# Patient Record
Sex: FEMALE | Race: WHITE | Employment: OTHER | URBAN - METROPOLITAN AREA
[De-identification: names, ages, dates, MRNs, and addresses within clinical notes are randomized per-mention and may not be internally consistent; named-entity substitution may affect disease eponyms.]

---

## 2017-06-20 ENCOUNTER — HOSPITAL ENCOUNTER (OUTPATIENT)
Dept: MAMMOGRAPHY | Age: 67
Discharge: HOME | End: 2017-06-20
Attending: OBSTETRICS & GYNECOLOGY | Admitting: OBSTETRICS & GYNECOLOGY

## 2017-06-20 DIAGNOSIS — Z13.9 SCREENING: ICD-10-CM

## 2018-07-03 ENCOUNTER — HOSPITAL ENCOUNTER (OUTPATIENT)
Dept: MAMMOGRAPHY | Age: 68
Discharge: HOME | End: 2018-07-03
Admitting: INTERNAL MEDICINE

## 2018-07-03 DIAGNOSIS — N95.1 MENOPAUSAL STATE: ICD-10-CM

## 2018-07-03 DIAGNOSIS — Z12.31 ENCOUNTER FOR SCREENING MAMMOGRAM FOR BREAST CANCER: ICD-10-CM

## 2018-07-03 DIAGNOSIS — N95.1 FEMALE CLIMACTERIC STATE: ICD-10-CM

## 2019-01-06 ENCOUNTER — HOSPITAL ENCOUNTER (EMERGENCY)
Age: 69
Discharge: HOME | End: 2019-01-06
Attending: EMERGENCY MEDICINE
Payer: MEDICARE

## 2019-01-06 ENCOUNTER — APPOINTMENT (OUTPATIENT)
Dept: CT IMAGING | Age: 69
End: 2019-01-06
Payer: MEDICARE

## 2019-01-06 VITALS
TEMPERATURE: 98.1 F | HEART RATE: 78 BPM | RESPIRATION RATE: 18 BRPM | SYSTOLIC BLOOD PRESSURE: 130 MMHG | HEIGHT: 63 IN | WEIGHT: 148 LBS | DIASTOLIC BLOOD PRESSURE: 70 MMHG | BODY MASS INDEX: 26.22 KG/M2 | OXYGEN SATURATION: 96 %

## 2019-01-06 DIAGNOSIS — R19.7 NAUSEA VOMITING AND DIARRHEA: Primary | ICD-10-CM

## 2019-01-06 DIAGNOSIS — R11.2 NAUSEA VOMITING AND DIARRHEA: Primary | ICD-10-CM

## 2019-01-06 LAB
A/G RATIO: 1.3 (ref 1.1–2.2)
ALBUMIN SERPL-MCNC: 3.9 G/DL (ref 3.4–5)
ALP BLD-CCNC: 53 U/L (ref 40–129)
ALT SERPL-CCNC: 31 U/L (ref 10–40)
ANION GAP SERPL CALCULATED.3IONS-SCNC: 12 MMOL/L (ref 3–16)
AST SERPL-CCNC: 37 U/L (ref 15–37)
BASOPHILS ABSOLUTE: 0 K/UL (ref 0–0.2)
BASOPHILS RELATIVE PERCENT: 0.4 %
BILIRUB SERPL-MCNC: 0.4 MG/DL (ref 0–1)
BILIRUBIN URINE: NEGATIVE
BLOOD, URINE: ABNORMAL
BUN BLDV-MCNC: 9 MG/DL (ref 7–20)
CALCIUM SERPL-MCNC: 8.8 MG/DL (ref 8.3–10.6)
CHLORIDE BLD-SCNC: 100 MMOL/L (ref 99–110)
CLARITY: CLEAR
CO2: 23 MMOL/L (ref 21–32)
COLOR: YELLOW
CREAT SERPL-MCNC: 0.6 MG/DL (ref 0.6–1.2)
EOSINOPHILS ABSOLUTE: 0 K/UL (ref 0–0.6)
EOSINOPHILS RELATIVE PERCENT: 0.8 %
GFR AFRICAN AMERICAN: >60
GFR NON-AFRICAN AMERICAN: >60
GLOBULIN: 2.9 G/DL
GLUCOSE BLD-MCNC: 102 MG/DL (ref 70–99)
GLUCOSE URINE: NEGATIVE MG/DL
HCT VFR BLD CALC: 39.8 % (ref 36–48)
HEMOGLOBIN: 13.4 G/DL (ref 12–16)
KETONES, URINE: NEGATIVE MG/DL
LEUKOCYTE ESTERASE, URINE: NEGATIVE
LIPASE: 28 U/L (ref 13–60)
LYMPHOCYTES ABSOLUTE: 0.7 K/UL (ref 1–5.1)
LYMPHOCYTES RELATIVE PERCENT: 20.8 %
MCH RBC QN AUTO: 30.3 PG (ref 26–34)
MCHC RBC AUTO-ENTMCNC: 33.6 G/DL (ref 31–36)
MCV RBC AUTO: 90.2 FL (ref 80–100)
MICROSCOPIC EXAMINATION: YES
MONOCYTES ABSOLUTE: 0.4 K/UL (ref 0–1.3)
MONOCYTES RELATIVE PERCENT: 11.4 %
NEUTROPHILS ABSOLUTE: 2.2 K/UL (ref 1.7–7.7)
NEUTROPHILS RELATIVE PERCENT: 66.6 %
NITRITE, URINE: NEGATIVE
PDW BLD-RTO: 13.3 % (ref 12.4–15.4)
PH UA: 7
PLATELET # BLD: 190 K/UL (ref 135–450)
PMV BLD AUTO: 6.6 FL (ref 5–10.5)
POTASSIUM SERPL-SCNC: 3.5 MMOL/L (ref 3.5–5.1)
PROTEIN UA: NEGATIVE MG/DL
RBC # BLD: 4.42 M/UL (ref 4–5.2)
RBC UA: NORMAL /HPF (ref 0–2)
SODIUM BLD-SCNC: 135 MMOL/L (ref 136–145)
SPECIFIC GRAVITY UA: <=1.005
TOTAL PROTEIN: 6.8 G/DL (ref 6.4–8.2)
URINE REFLEX TO CULTURE: ABNORMAL
URINE TYPE: ABNORMAL
UROBILINOGEN, URINE: 0.2 E.U./DL
WBC # BLD: 3.2 K/UL (ref 4–11)
WBC UA: NORMAL /HPF (ref 0–5)

## 2019-01-06 PROCEDURE — 6370000000 HC RX 637 (ALT 250 FOR IP): Performed by: EMERGENCY MEDICINE

## 2019-01-06 PROCEDURE — 6360000002 HC RX W HCPCS: Performed by: EMERGENCY MEDICINE

## 2019-01-06 PROCEDURE — 99284 EMERGENCY DEPT VISIT MOD MDM: CPT

## 2019-01-06 PROCEDURE — 74177 CT ABD & PELVIS W/CONTRAST: CPT

## 2019-01-06 PROCEDURE — 80053 COMPREHEN METABOLIC PANEL: CPT

## 2019-01-06 PROCEDURE — 83690 ASSAY OF LIPASE: CPT

## 2019-01-06 PROCEDURE — 96374 THER/PROPH/DIAG INJ IV PUSH: CPT

## 2019-01-06 PROCEDURE — 96361 HYDRATE IV INFUSION ADD-ON: CPT

## 2019-01-06 PROCEDURE — 6360000004 HC RX CONTRAST MEDICATION: Performed by: EMERGENCY MEDICINE

## 2019-01-06 PROCEDURE — 85025 COMPLETE CBC W/AUTO DIFF WBC: CPT

## 2019-01-06 PROCEDURE — 2580000003 HC RX 258: Performed by: EMERGENCY MEDICINE

## 2019-01-06 PROCEDURE — 81001 URINALYSIS AUTO W/SCOPE: CPT

## 2019-01-06 PROCEDURE — 36415 COLL VENOUS BLD VENIPUNCTURE: CPT

## 2019-01-06 PROCEDURE — 96375 TX/PRO/DX INJ NEW DRUG ADDON: CPT

## 2019-01-06 RX ORDER — 0.9 % SODIUM CHLORIDE 0.9 %
1000 INTRAVENOUS SOLUTION INTRAVENOUS ONCE
Status: COMPLETED | OUTPATIENT
Start: 2019-01-06 | End: 2019-01-06

## 2019-01-06 RX ORDER — PANTOPRAZOLE SODIUM 40 MG/1
40 TABLET, DELAYED RELEASE ORAL ONCE
Status: COMPLETED | OUTPATIENT
Start: 2019-01-06 | End: 2019-01-06

## 2019-01-06 RX ORDER — ACETAMINOPHEN 325 MG/1
650 TABLET ORAL EVERY 6 HOURS PRN
COMMUNITY

## 2019-01-06 RX ORDER — ATORVASTATIN CALCIUM 20 MG/1
20 TABLET, FILM COATED ORAL DAILY
COMMUNITY

## 2019-01-06 RX ORDER — ONDANSETRON 2 MG/ML
4 INJECTION INTRAMUSCULAR; INTRAVENOUS ONCE
Status: COMPLETED | OUTPATIENT
Start: 2019-01-06 | End: 2019-01-06

## 2019-01-06 RX ORDER — ONDANSETRON 4 MG/1
4 TABLET, FILM COATED ORAL EVERY 8 HOURS PRN
Qty: 20 TABLET | Refills: 0 | Status: SHIPPED | OUTPATIENT
Start: 2019-01-06 | End: 2019-10-15 | Stop reason: ALTCHOICE

## 2019-01-06 RX ADMIN — PANTOPRAZOLE SODIUM 40 MG: 40 TABLET, DELAYED RELEASE ORAL at 10:18

## 2019-01-06 RX ADMIN — SODIUM CHLORIDE 1000 ML: 9 INJECTION, SOLUTION INTRAVENOUS at 10:18

## 2019-01-06 RX ADMIN — IOPAMIDOL 75 ML: 755 INJECTION, SOLUTION INTRAVENOUS at 10:46

## 2019-01-06 RX ADMIN — ONDANSETRON 4 MG: 2 INJECTION INTRAMUSCULAR; INTRAVENOUS at 10:18

## 2019-01-06 RX ADMIN — LIDOCAINE HYDROCHLORIDE: 20 SOLUTION ORAL; TOPICAL at 11:33

## 2019-01-06 ASSESSMENT — ENCOUNTER SYMPTOMS
STRIDOR: 0
NAUSEA: 1
TROUBLE SWALLOWING: 0
FACIAL SWELLING: 0
DIARRHEA: 1
VOMITING: 1
WHEEZING: 0
BACK PAIN: 0
ABDOMINAL PAIN: 1
COLOR CHANGE: 0
VOICE CHANGE: 0
SHORTNESS OF BREATH: 0

## 2019-01-06 ASSESSMENT — PAIN DESCRIPTION - DESCRIPTORS: DESCRIPTORS: CONSTANT

## 2019-01-06 ASSESSMENT — PAIN DESCRIPTION - LOCATION: LOCATION: HEAD

## 2019-01-06 ASSESSMENT — PAIN SCALES - GENERAL: PAINLEVEL_OUTOF10: 1

## 2019-10-15 ENCOUNTER — HOSPITAL ENCOUNTER (INPATIENT)
Age: 69
LOS: 2 days | Discharge: HOME | DRG: 389 | End: 2019-10-17
Attending: EMERGENCY MEDICINE | Admitting: SURGERY
Payer: MEDICARE

## 2019-10-15 ENCOUNTER — APPOINTMENT (OUTPATIENT)
Dept: GENERAL RADIOLOGY | Age: 69
DRG: 389 | End: 2019-10-15
Payer: MEDICARE

## 2019-10-15 ENCOUNTER — APPOINTMENT (OUTPATIENT)
Dept: CT IMAGING | Age: 69
DRG: 389 | End: 2019-10-15
Payer: MEDICARE

## 2019-10-15 DIAGNOSIS — K56.609 SMALL BOWEL OBSTRUCTION (HCC): Primary | ICD-10-CM

## 2019-10-15 LAB
A/G RATIO: 1.6 (ref 1.1–2.2)
ALBUMIN SERPL-MCNC: 4.9 G/DL (ref 3.4–5)
ALP BLD-CCNC: 57 U/L (ref 40–129)
ALT SERPL-CCNC: 19 U/L (ref 10–40)
ANION GAP SERPL CALCULATED.3IONS-SCNC: 12 MMOL/L (ref 3–16)
AST SERPL-CCNC: 28 U/L (ref 15–37)
BASOPHILS ABSOLUTE: 0 K/UL (ref 0–0.2)
BASOPHILS RELATIVE PERCENT: 0.3 %
BILIRUB SERPL-MCNC: 0.4 MG/DL (ref 0–1)
BILIRUBIN URINE: NEGATIVE
BLOOD, URINE: NEGATIVE
BUN BLDV-MCNC: 19 MG/DL (ref 7–20)
CALCIUM SERPL-MCNC: 10.3 MG/DL (ref 8.3–10.6)
CHLORIDE BLD-SCNC: 92 MMOL/L (ref 99–110)
CLARITY: CLEAR
CO2: 24 MMOL/L (ref 21–32)
COLOR: YELLOW
CREAT SERPL-MCNC: 0.9 MG/DL (ref 0.6–1.2)
EKG ATRIAL RATE: 69 BPM
EKG DIAGNOSIS: NORMAL
EKG P AXIS: 48 DEGREES
EKG P-R INTERVAL: 200 MS
EKG Q-T INTERVAL: 420 MS
EKG QRS DURATION: 86 MS
EKG QTC CALCULATION (BAZETT): 450 MS
EKG R AXIS: -4 DEGREES
EKG T AXIS: 51 DEGREES
EKG VENTRICULAR RATE: 69 BPM
EOSINOPHILS ABSOLUTE: 0.1 K/UL (ref 0–0.6)
EOSINOPHILS RELATIVE PERCENT: 0.9 %
GFR AFRICAN AMERICAN: >60
GFR NON-AFRICAN AMERICAN: >60
GLOBULIN: 3 G/DL
GLUCOSE BLD-MCNC: 123 MG/DL (ref 70–99)
GLUCOSE URINE: NEGATIVE MG/DL
HCT VFR BLD CALC: 41.6 % (ref 36–48)
HEMOGLOBIN: 14.2 G/DL (ref 12–16)
KETONES, URINE: NEGATIVE MG/DL
LEUKOCYTE ESTERASE, URINE: NEGATIVE
LIPASE: 27 U/L (ref 13–60)
LYMPHOCYTES ABSOLUTE: 1.2 K/UL (ref 1–5.1)
LYMPHOCYTES RELATIVE PERCENT: 16.8 %
MCH RBC QN AUTO: 31.3 PG (ref 26–34)
MCHC RBC AUTO-ENTMCNC: 34.1 G/DL (ref 31–36)
MCV RBC AUTO: 91.7 FL (ref 80–100)
MICROSCOPIC EXAMINATION: NORMAL
MONOCYTES ABSOLUTE: 0.6 K/UL (ref 0–1.3)
MONOCYTES RELATIVE PERCENT: 8.2 %
NEUTROPHILS ABSOLUTE: 5.3 K/UL (ref 1.7–7.7)
NEUTROPHILS RELATIVE PERCENT: 73.8 %
NITRITE, URINE: NEGATIVE
PDW BLD-RTO: 13.7 % (ref 12.4–15.4)
PH UA: 7 (ref 5–8)
PLATELET # BLD: 261 K/UL (ref 135–450)
PMV BLD AUTO: 6.8 FL (ref 5–10.5)
POTASSIUM SERPL-SCNC: 3.9 MMOL/L (ref 3.5–5.1)
PROTEIN UA: NEGATIVE MG/DL
RBC # BLD: 4.54 M/UL (ref 4–5.2)
SODIUM BLD-SCNC: 128 MMOL/L (ref 136–145)
SPECIFIC GRAVITY UA: <=1.005 (ref 1–1.03)
TOTAL PROTEIN: 7.9 G/DL (ref 6.4–8.2)
TROPONIN: <0.01 NG/ML
URINE TYPE: NORMAL
UROBILINOGEN, URINE: 0.2 E.U./DL
WBC # BLD: 7.2 K/UL (ref 4–11)

## 2019-10-15 PROCEDURE — 84484 ASSAY OF TROPONIN QUANT: CPT

## 2019-10-15 PROCEDURE — 93005 ELECTROCARDIOGRAM TRACING: CPT | Performed by: EMERGENCY MEDICINE

## 2019-10-15 PROCEDURE — 2500000003 HC RX 250 WO HCPCS: Performed by: SURGERY

## 2019-10-15 PROCEDURE — 74018 RADEX ABDOMEN 1 VIEW: CPT

## 2019-10-15 PROCEDURE — 6360000004 HC RX CONTRAST MEDICATION: Performed by: EMERGENCY MEDICINE

## 2019-10-15 PROCEDURE — 85025 COMPLETE CBC W/AUTO DIFF WBC: CPT

## 2019-10-15 PROCEDURE — 74177 CT ABD & PELVIS W/CONTRAST: CPT

## 2019-10-15 PROCEDURE — 96375 TX/PRO/DX INJ NEW DRUG ADDON: CPT

## 2019-10-15 PROCEDURE — 6370000000 HC RX 637 (ALT 250 FOR IP): Performed by: EMERGENCY MEDICINE

## 2019-10-15 PROCEDURE — 2580000003 HC RX 258: Performed by: SURGERY

## 2019-10-15 PROCEDURE — 2580000003 HC RX 258: Performed by: EMERGENCY MEDICINE

## 2019-10-15 PROCEDURE — 99222 1ST HOSP IP/OBS MODERATE 55: CPT | Performed by: SURGERY

## 2019-10-15 PROCEDURE — 0D9670Z DRAINAGE OF STOMACH WITH DRAINAGE DEVICE, VIA NATURAL OR ARTIFICIAL OPENING: ICD-10-PCS | Performed by: SURGERY

## 2019-10-15 PROCEDURE — 6370000000 HC RX 637 (ALT 250 FOR IP): Performed by: SURGERY

## 2019-10-15 PROCEDURE — 99285 EMERGENCY DEPT VISIT HI MDM: CPT

## 2019-10-15 PROCEDURE — 96361 HYDRATE IV INFUSION ADD-ON: CPT

## 2019-10-15 PROCEDURE — 6360000002 HC RX W HCPCS: Performed by: SURGERY

## 2019-10-15 PROCEDURE — 83690 ASSAY OF LIPASE: CPT

## 2019-10-15 PROCEDURE — 6360000002 HC RX W HCPCS: Performed by: EMERGENCY MEDICINE

## 2019-10-15 PROCEDURE — 6370000000 HC RX 637 (ALT 250 FOR IP): Performed by: NURSE PRACTITIONER

## 2019-10-15 PROCEDURE — 1200000000 HC SEMI PRIVATE

## 2019-10-15 PROCEDURE — 93010 ELECTROCARDIOGRAM REPORT: CPT | Performed by: INTERNAL MEDICINE

## 2019-10-15 PROCEDURE — 80053 COMPREHEN METABOLIC PANEL: CPT

## 2019-10-15 PROCEDURE — 81003 URINALYSIS AUTO W/O SCOPE: CPT

## 2019-10-15 PROCEDURE — 96374 THER/PROPH/DIAG INJ IV PUSH: CPT

## 2019-10-15 RX ORDER — ATORVASTATIN CALCIUM 10 MG/1
20 TABLET, FILM COATED ORAL NIGHTLY
Status: DISCONTINUED | OUTPATIENT
Start: 2019-10-15 | End: 2019-10-17 | Stop reason: HOSPADM

## 2019-10-15 RX ORDER — SODIUM CHLORIDE 9 MG/ML
INJECTION, SOLUTION INTRAVENOUS CONTINUOUS
Status: DISCONTINUED | OUTPATIENT
Start: 2019-10-15 | End: 2019-10-17 | Stop reason: HOSPADM

## 2019-10-15 RX ORDER — LOSARTAN POTASSIUM 25 MG/1
50 TABLET ORAL DAILY
Status: DISCONTINUED | OUTPATIENT
Start: 2019-10-15 | End: 2019-10-17 | Stop reason: HOSPADM

## 2019-10-15 RX ORDER — ACETAMINOPHEN 650 MG/1
650 SUPPOSITORY RECTAL EVERY 6 HOURS PRN
Status: DISCONTINUED | OUTPATIENT
Start: 2019-10-15 | End: 2019-10-17 | Stop reason: HOSPADM

## 2019-10-15 RX ORDER — 0.9 % SODIUM CHLORIDE 0.9 %
1000 INTRAVENOUS SOLUTION INTRAVENOUS ONCE
Status: COMPLETED | OUTPATIENT
Start: 2019-10-15 | End: 2019-10-15

## 2019-10-15 RX ORDER — FLUOXETINE 10 MG/1
10 CAPSULE ORAL DAILY
Status: DISCONTINUED | OUTPATIENT
Start: 2019-10-15 | End: 2019-10-17 | Stop reason: HOSPADM

## 2019-10-15 RX ORDER — ONDANSETRON 2 MG/ML
4 INJECTION INTRAMUSCULAR; INTRAVENOUS EVERY 4 HOURS PRN
Status: DISCONTINUED | OUTPATIENT
Start: 2019-10-15 | End: 2019-10-17 | Stop reason: HOSPADM

## 2019-10-15 RX ORDER — ONDANSETRON 2 MG/ML
4 INJECTION INTRAMUSCULAR; INTRAVENOUS ONCE
Status: COMPLETED | OUTPATIENT
Start: 2019-10-15 | End: 2019-10-15

## 2019-10-15 RX ORDER — MORPHINE SULFATE 4 MG/ML
4 INJECTION, SOLUTION INTRAMUSCULAR; INTRAVENOUS ONCE
Status: COMPLETED | OUTPATIENT
Start: 2019-10-15 | End: 2019-10-15

## 2019-10-15 RX ORDER — PROMETHAZINE HYDROCHLORIDE 25 MG/ML
6.25 INJECTION, SOLUTION INTRAMUSCULAR; INTRAVENOUS EVERY 6 HOURS PRN
Status: DISCONTINUED | OUTPATIENT
Start: 2019-10-15 | End: 2019-10-17 | Stop reason: HOSPADM

## 2019-10-15 RX ORDER — MAGNESIUM HYDROXIDE/ALUMINUM HYDROXICE/SIMETHICONE 120; 1200; 1200 MG/30ML; MG/30ML; MG/30ML
15 SUSPENSION ORAL ONCE
Status: COMPLETED | OUTPATIENT
Start: 2019-10-15 | End: 2019-10-15

## 2019-10-15 RX ADMIN — ATORVASTATIN CALCIUM 20 MG: 10 TABLET, FILM COATED ORAL at 19:46

## 2019-10-15 RX ADMIN — ONDANSETRON HYDROCHLORIDE 4 MG: 2 INJECTION, SOLUTION INTRAMUSCULAR; INTRAVENOUS at 03:47

## 2019-10-15 RX ADMIN — ALUMINUM HYDROXIDE, MAGNESIUM HYDROXIDE, AND SIMETHICONE 15 ML: 200; 200; 20 SUSPENSION ORAL at 05:50

## 2019-10-15 RX ADMIN — Medication 1 SPRAY: at 20:44

## 2019-10-15 RX ADMIN — ENOXAPARIN SODIUM 40 MG: 40 INJECTION SUBCUTANEOUS at 08:24

## 2019-10-15 RX ADMIN — SODIUM CHLORIDE 1000 ML: 9 INJECTION, SOLUTION INTRAVENOUS at 03:47

## 2019-10-15 RX ADMIN — MORPHINE SULFATE 4 MG: 4 INJECTION INTRAVENOUS at 03:47

## 2019-10-15 RX ADMIN — HYDROMORPHONE HYDROCHLORIDE 0.5 MG: 1 INJECTION, SOLUTION INTRAMUSCULAR; INTRAVENOUS; SUBCUTANEOUS at 08:28

## 2019-10-15 RX ADMIN — FAMOTIDINE 20 MG: 10 INJECTION, SOLUTION INTRAVENOUS at 20:35

## 2019-10-15 RX ADMIN — PROMETHAZINE HYDROCHLORIDE 6.25 MG: 25 INJECTION INTRAMUSCULAR; INTRAVENOUS at 08:43

## 2019-10-15 RX ADMIN — LOSARTAN POTASSIUM 50 MG: 25 TABLET, FILM COATED ORAL at 19:46

## 2019-10-15 RX ADMIN — PROMETHAZINE HYDROCHLORIDE 6.25 MG: 25 INJECTION INTRAMUSCULAR; INTRAVENOUS at 16:09

## 2019-10-15 RX ADMIN — Medication 1 SPRAY: at 11:23

## 2019-10-15 RX ADMIN — SODIUM CHLORIDE: 9 INJECTION, SOLUTION INTRAVENOUS at 16:11

## 2019-10-15 RX ADMIN — HYDROMORPHONE HYDROCHLORIDE 0.5 MG: 1 INJECTION, SOLUTION INTRAMUSCULAR; INTRAVENOUS; SUBCUTANEOUS at 16:10

## 2019-10-15 RX ADMIN — SODIUM CHLORIDE: 9 INJECTION, SOLUTION INTRAVENOUS at 08:24

## 2019-10-15 RX ADMIN — IOPAMIDOL 75 ML: 755 INJECTION, SOLUTION INTRAVENOUS at 03:56

## 2019-10-15 RX ADMIN — HYDROMORPHONE HYDROCHLORIDE 0.5 MG: 1 INJECTION, SOLUTION INTRAMUSCULAR; INTRAVENOUS; SUBCUTANEOUS at 20:48

## 2019-10-15 RX ADMIN — FLUOXETINE 10 MG: 10 CAPSULE ORAL at 19:46

## 2019-10-15 RX ADMIN — FAMOTIDINE 20 MG: 10 INJECTION, SOLUTION INTRAVENOUS at 08:24

## 2019-10-15 RX ADMIN — ONDANSETRON HYDROCHLORIDE 4 MG: 2 INJECTION, SOLUTION INTRAMUSCULAR; INTRAVENOUS at 18:56

## 2019-10-15 ASSESSMENT — PAIN DESCRIPTION - PAIN TYPE
TYPE: ACUTE PAIN

## 2019-10-15 ASSESSMENT — PAIN - FUNCTIONAL ASSESSMENT: PAIN_FUNCTIONAL_ASSESSMENT: ACTIVITIES ARE NOT PREVENTED

## 2019-10-15 ASSESSMENT — PAIN DESCRIPTION - LOCATION
LOCATION: ABDOMEN
LOCATION: ABDOMEN
LOCATION: JAW
LOCATION: ABDOMEN

## 2019-10-15 ASSESSMENT — PAIN DESCRIPTION - DESCRIPTORS
DESCRIPTORS: SHARP
DESCRIPTORS: CRAMPING

## 2019-10-15 ASSESSMENT — PAIN DESCRIPTION - FREQUENCY
FREQUENCY: INTERMITTENT
FREQUENCY: CONTINUOUS

## 2019-10-15 ASSESSMENT — PAIN SCALES - GENERAL
PAINLEVEL_OUTOF10: 5
PAINLEVEL_OUTOF10: 3
PAINLEVEL_OUTOF10: 6
PAINLEVEL_OUTOF10: 5
PAINLEVEL_OUTOF10: 2
PAINLEVEL_OUTOF10: 4

## 2019-10-15 ASSESSMENT — PAIN DESCRIPTION - PROGRESSION: CLINICAL_PROGRESSION: GRADUALLY WORSENING

## 2019-10-15 ASSESSMENT — PAIN DESCRIPTION - ORIENTATION
ORIENTATION: UPPER
ORIENTATION: RIGHT;LEFT;MID

## 2019-10-15 ASSESSMENT — PAIN DESCRIPTION - ONSET: ONSET: ON-GOING

## 2019-10-16 ENCOUNTER — APPOINTMENT (OUTPATIENT)
Dept: GENERAL RADIOLOGY | Age: 69
DRG: 389 | End: 2019-10-16
Payer: MEDICARE

## 2019-10-16 LAB
ANION GAP SERPL CALCULATED.3IONS-SCNC: 11 MMOL/L (ref 3–16)
BASOPHILS ABSOLUTE: 0 K/UL (ref 0–0.2)
BASOPHILS RELATIVE PERCENT: 0.2 %
BUN BLDV-MCNC: 12 MG/DL (ref 7–20)
CALCIUM SERPL-MCNC: 8.1 MG/DL (ref 8.3–10.6)
CHLORIDE BLD-SCNC: 105 MMOL/L (ref 99–110)
CO2: 22 MMOL/L (ref 21–32)
CREAT SERPL-MCNC: 0.6 MG/DL (ref 0.6–1.2)
EOSINOPHILS ABSOLUTE: 0 K/UL (ref 0–0.6)
EOSINOPHILS RELATIVE PERCENT: 0.1 %
GFR AFRICAN AMERICAN: >60
GFR NON-AFRICAN AMERICAN: >60
GLUCOSE BLD-MCNC: 101 MG/DL (ref 70–99)
HCT VFR BLD CALC: 35 % (ref 36–48)
HEMOGLOBIN: 11.7 G/DL (ref 12–16)
LYMPHOCYTES ABSOLUTE: 1.1 K/UL (ref 1–5.1)
LYMPHOCYTES RELATIVE PERCENT: 14.7 %
MAGNESIUM: 2.1 MG/DL (ref 1.8–2.4)
MCH RBC QN AUTO: 31 PG (ref 26–34)
MCHC RBC AUTO-ENTMCNC: 33.5 G/DL (ref 31–36)
MCV RBC AUTO: 92.4 FL (ref 80–100)
MONOCYTES ABSOLUTE: 0.6 K/UL (ref 0–1.3)
MONOCYTES RELATIVE PERCENT: 8.2 %
NEUTROPHILS ABSOLUTE: 5.6 K/UL (ref 1.7–7.7)
NEUTROPHILS RELATIVE PERCENT: 76.8 %
PDW BLD-RTO: 13.9 % (ref 12.4–15.4)
PHOSPHORUS: 2.5 MG/DL (ref 2.5–4.9)
PLATELET # BLD: 207 K/UL (ref 135–450)
PMV BLD AUTO: 7 FL (ref 5–10.5)
POTASSIUM SERPL-SCNC: 3.9 MMOL/L (ref 3.5–5.1)
RBC # BLD: 3.79 M/UL (ref 4–5.2)
SODIUM BLD-SCNC: 138 MMOL/L (ref 136–145)
WBC # BLD: 7.2 K/UL (ref 4–11)

## 2019-10-16 PROCEDURE — 2500000003 HC RX 250 WO HCPCS: Performed by: SURGERY

## 2019-10-16 PROCEDURE — 1200000000 HC SEMI PRIVATE

## 2019-10-16 PROCEDURE — 6360000002 HC RX W HCPCS: Performed by: SURGERY

## 2019-10-16 PROCEDURE — 36415 COLL VENOUS BLD VENIPUNCTURE: CPT

## 2019-10-16 PROCEDURE — 83735 ASSAY OF MAGNESIUM: CPT

## 2019-10-16 PROCEDURE — 85025 COMPLETE CBC W/AUTO DIFF WBC: CPT

## 2019-10-16 PROCEDURE — 6370000000 HC RX 637 (ALT 250 FOR IP): Performed by: SURGERY

## 2019-10-16 PROCEDURE — 2580000003 HC RX 258: Performed by: SURGERY

## 2019-10-16 PROCEDURE — 99232 SBSQ HOSP IP/OBS MODERATE 35: CPT | Performed by: SURGERY

## 2019-10-16 PROCEDURE — 6360000002 HC RX W HCPCS: Performed by: NURSE PRACTITIONER

## 2019-10-16 PROCEDURE — 80048 BASIC METABOLIC PNL TOTAL CA: CPT

## 2019-10-16 PROCEDURE — 74019 RADEX ABDOMEN 2 VIEWS: CPT

## 2019-10-16 PROCEDURE — 84100 ASSAY OF PHOSPHORUS: CPT

## 2019-10-16 RX ORDER — KETOROLAC TROMETHAMINE 30 MG/ML
15 INJECTION, SOLUTION INTRAMUSCULAR; INTRAVENOUS EVERY 6 HOURS PRN
Status: DISCONTINUED | OUTPATIENT
Start: 2019-10-16 | End: 2019-10-17 | Stop reason: HOSPADM

## 2019-10-16 RX ADMIN — ATORVASTATIN CALCIUM 20 MG: 10 TABLET, FILM COATED ORAL at 20:42

## 2019-10-16 RX ADMIN — ENOXAPARIN SODIUM 40 MG: 40 INJECTION SUBCUTANEOUS at 05:49

## 2019-10-16 RX ADMIN — ONDANSETRON HYDROCHLORIDE 4 MG: 2 INJECTION, SOLUTION INTRAMUSCULAR; INTRAVENOUS at 10:02

## 2019-10-16 RX ADMIN — SODIUM CHLORIDE: 9 INJECTION, SOLUTION INTRAVENOUS at 01:00

## 2019-10-16 RX ADMIN — ACETAMINOPHEN 650 MG: 650 SUPPOSITORY RECTAL at 12:26

## 2019-10-16 RX ADMIN — KETOROLAC TROMETHAMINE 15 MG: 30 INJECTION, SOLUTION INTRAMUSCULAR at 15:42

## 2019-10-16 RX ADMIN — SODIUM CHLORIDE: 9 INJECTION, SOLUTION INTRAVENOUS at 15:42

## 2019-10-16 RX ADMIN — FAMOTIDINE 20 MG: 10 INJECTION, SOLUTION INTRAVENOUS at 08:59

## 2019-10-16 RX ADMIN — KETOROLAC TROMETHAMINE 15 MG: 30 INJECTION, SOLUTION INTRAMUSCULAR at 22:11

## 2019-10-16 RX ADMIN — Medication 1 SPRAY: at 00:59

## 2019-10-16 RX ADMIN — SODIUM CHLORIDE: 9 INJECTION, SOLUTION INTRAVENOUS at 08:59

## 2019-10-16 RX ADMIN — FAMOTIDINE 20 MG: 10 INJECTION, SOLUTION INTRAVENOUS at 20:43

## 2019-10-16 ASSESSMENT — PAIN SCALES - GENERAL
PAINLEVEL_OUTOF10: 4
PAINLEVEL_OUTOF10: 3
PAINLEVEL_OUTOF10: 4

## 2019-10-17 ENCOUNTER — APPOINTMENT (OUTPATIENT)
Dept: GENERAL RADIOLOGY | Age: 69
DRG: 389 | End: 2019-10-17
Payer: MEDICARE

## 2019-10-17 VITALS
DIASTOLIC BLOOD PRESSURE: 90 MMHG | BODY MASS INDEX: 26.28 KG/M2 | HEART RATE: 91 BPM | WEIGHT: 142.8 LBS | RESPIRATION RATE: 16 BRPM | HEIGHT: 62 IN | OXYGEN SATURATION: 100 % | SYSTOLIC BLOOD PRESSURE: 157 MMHG | TEMPERATURE: 97 F

## 2019-10-17 PROBLEM — K56.609 SMALL BOWEL OBSTRUCTION (HCC): Status: RESOLVED | Noted: 2019-10-15 | Resolved: 2019-10-17

## 2019-10-17 LAB
ANION GAP SERPL CALCULATED.3IONS-SCNC: 14 MMOL/L (ref 3–16)
BASOPHILS ABSOLUTE: 0 K/UL (ref 0–0.2)
BASOPHILS RELATIVE PERCENT: 0.2 %
BUN BLDV-MCNC: 10 MG/DL (ref 7–20)
CALCIUM SERPL-MCNC: 8 MG/DL (ref 8.3–10.6)
CHLORIDE BLD-SCNC: 102 MMOL/L (ref 99–110)
CO2: 20 MMOL/L (ref 21–32)
CREAT SERPL-MCNC: <0.5 MG/DL (ref 0.6–1.2)
EOSINOPHILS ABSOLUTE: 0.1 K/UL (ref 0–0.6)
EOSINOPHILS RELATIVE PERCENT: 0.8 %
GFR AFRICAN AMERICAN: >60
GFR NON-AFRICAN AMERICAN: >60
GLUCOSE BLD-MCNC: 66 MG/DL (ref 70–99)
HCT VFR BLD CALC: 34.6 % (ref 36–48)
HEMOGLOBIN: 11.5 G/DL (ref 12–16)
LYMPHOCYTES ABSOLUTE: 1.1 K/UL (ref 1–5.1)
LYMPHOCYTES RELATIVE PERCENT: 16.1 %
MAGNESIUM: 1.9 MG/DL (ref 1.8–2.4)
MCH RBC QN AUTO: 30.9 PG (ref 26–34)
MCHC RBC AUTO-ENTMCNC: 33.3 G/DL (ref 31–36)
MCV RBC AUTO: 92.7 FL (ref 80–100)
MONOCYTES ABSOLUTE: 0.5 K/UL (ref 0–1.3)
MONOCYTES RELATIVE PERCENT: 7.6 %
NEUTROPHILS ABSOLUTE: 5 K/UL (ref 1.7–7.7)
NEUTROPHILS RELATIVE PERCENT: 75.3 %
PDW BLD-RTO: 13.6 % (ref 12.4–15.4)
PHOSPHORUS: 1.6 MG/DL (ref 2.5–4.9)
PLATELET # BLD: 168 K/UL (ref 135–450)
PMV BLD AUTO: 7 FL (ref 5–10.5)
POTASSIUM SERPL-SCNC: 3.7 MMOL/L (ref 3.5–5.1)
RBC # BLD: 3.73 M/UL (ref 4–5.2)
SODIUM BLD-SCNC: 136 MMOL/L (ref 136–145)
WBC # BLD: 6.7 K/UL (ref 4–11)

## 2019-10-17 PROCEDURE — 85025 COMPLETE CBC W/AUTO DIFF WBC: CPT

## 2019-10-17 PROCEDURE — 6370000000 HC RX 637 (ALT 250 FOR IP): Performed by: SURGERY

## 2019-10-17 PROCEDURE — 83735 ASSAY OF MAGNESIUM: CPT

## 2019-10-17 PROCEDURE — 99238 HOSP IP/OBS DSCHRG MGMT 30/<: CPT | Performed by: SURGERY

## 2019-10-17 PROCEDURE — 84100 ASSAY OF PHOSPHORUS: CPT

## 2019-10-17 PROCEDURE — 36415 COLL VENOUS BLD VENIPUNCTURE: CPT

## 2019-10-17 PROCEDURE — 2500000003 HC RX 250 WO HCPCS: Performed by: SURGERY

## 2019-10-17 PROCEDURE — 80048 BASIC METABOLIC PNL TOTAL CA: CPT

## 2019-10-17 PROCEDURE — 2500000003 HC RX 250 WO HCPCS: Performed by: NURSE PRACTITIONER

## 2019-10-17 PROCEDURE — 74019 RADEX ABDOMEN 2 VIEWS: CPT

## 2019-10-17 PROCEDURE — 2580000003 HC RX 258: Performed by: NURSE PRACTITIONER

## 2019-10-17 RX ADMIN — POTASSIUM PHOSPHATE, MONOBASIC AND POTASSIUM PHOSPHATE, DIBASIC 20 MMOL: 224; 236 INJECTION, SOLUTION, CONCENTRATE INTRAVENOUS at 10:27

## 2019-10-17 RX ADMIN — FAMOTIDINE 20 MG: 10 INJECTION, SOLUTION INTRAVENOUS at 09:07

## 2019-10-17 RX ADMIN — FLUOXETINE 10 MG: 10 CAPSULE ORAL at 09:08

## 2019-10-17 RX ADMIN — LOSARTAN POTASSIUM 50 MG: 25 TABLET, FILM COATED ORAL at 09:07

## 2019-10-17 ASSESSMENT — PAIN SCALES - GENERAL: PAINLEVEL_OUTOF10: 0

## 2019-10-23 ENCOUNTER — HOSPITAL ENCOUNTER (OUTPATIENT)
Dept: MAMMOGRAPHY | Age: 69
Discharge: HOME | End: 2019-10-23
Payer: MEDICARE

## 2019-10-23 DIAGNOSIS — Z12.31 SCREENING MAMMOGRAM, ENCOUNTER FOR: ICD-10-CM

## 2019-10-23 PROCEDURE — 77063 BREAST TOMOSYNTHESIS BI: CPT

## 2020-10-26 ENCOUNTER — HOSPITAL ENCOUNTER (OUTPATIENT)
Dept: MAMMOGRAPHY | Age: 70
Discharge: HOME | End: 2020-10-26
Payer: MEDICARE

## 2020-10-26 ENCOUNTER — TELEPHONE (OUTPATIENT)
Dept: MAMMOGRAPHY | Age: 70
End: 2020-10-26

## 2020-10-26 PROCEDURE — 77063 BREAST TOMOSYNTHESIS BI: CPT

## 2021-05-13 ENCOUNTER — APPOINTMENT (OUTPATIENT)
Dept: CT IMAGING | Age: 71
DRG: 446 | End: 2021-05-13
Payer: MEDICARE

## 2021-05-13 ENCOUNTER — HOSPITAL ENCOUNTER (INPATIENT)
Age: 71
LOS: 3 days | Discharge: HOME | DRG: 446 | End: 2021-05-16
Attending: EMERGENCY MEDICINE | Admitting: INTERNAL MEDICINE
Payer: MEDICARE

## 2021-05-13 ENCOUNTER — APPOINTMENT (OUTPATIENT)
Dept: GENERAL RADIOLOGY | Age: 71
DRG: 446 | End: 2021-05-13
Payer: MEDICARE

## 2021-05-13 ENCOUNTER — APPOINTMENT (OUTPATIENT)
Dept: ULTRASOUND IMAGING | Age: 71
DRG: 446 | End: 2021-05-13
Payer: MEDICARE

## 2021-05-13 DIAGNOSIS — R10.10 PAIN OF UPPER ABDOMEN: Primary | ICD-10-CM

## 2021-05-13 DIAGNOSIS — R93.2 ABNORMAL GALLBLADDER ULTRASOUND: ICD-10-CM

## 2021-05-13 PROBLEM — R10.9 ABDOMINAL PAIN: Status: ACTIVE | Noted: 2021-05-13

## 2021-05-13 LAB
A/G RATIO: 1.7 (ref 1.1–2.2)
ALBUMIN SERPL-MCNC: 4.1 G/DL (ref 3.4–5)
ALP BLD-CCNC: 44 U/L (ref 40–129)
ALT SERPL-CCNC: 16 U/L (ref 10–40)
ANION GAP SERPL CALCULATED.3IONS-SCNC: 8 MMOL/L (ref 3–16)
AST SERPL-CCNC: 22 U/L (ref 15–37)
BASOPHILS ABSOLUTE: 0 K/UL (ref 0–0.2)
BASOPHILS RELATIVE PERCENT: 0.4 %
BILIRUB SERPL-MCNC: 0.3 MG/DL (ref 0–1)
BILIRUBIN URINE: NEGATIVE
BLOOD, URINE: NEGATIVE
BUN BLDV-MCNC: 12 MG/DL (ref 7–20)
CALCIUM SERPL-MCNC: 9.4 MG/DL (ref 8.3–10.6)
CHLORIDE BLD-SCNC: 100 MMOL/L (ref 99–110)
CLARITY: CLEAR
CO2: 27 MMOL/L (ref 21–32)
COLOR: YELLOW
CREAT SERPL-MCNC: 0.7 MG/DL (ref 0.6–1.2)
D DIMER: <200 NG/ML DDU (ref 0–229)
EKG ATRIAL RATE: 48 BPM
EKG DIAGNOSIS: NORMAL
EKG P AXIS: 20 DEGREES
EKG P-R INTERVAL: 180 MS
EKG Q-T INTERVAL: 470 MS
EKG QRS DURATION: 78 MS
EKG QTC CALCULATION (BAZETT): 419 MS
EKG R AXIS: -9 DEGREES
EKG T AXIS: 57 DEGREES
EKG VENTRICULAR RATE: 48 BPM
EOSINOPHILS ABSOLUTE: 0.1 K/UL (ref 0–0.6)
EOSINOPHILS RELATIVE PERCENT: 1.4 %
GFR AFRICAN AMERICAN: >60
GFR NON-AFRICAN AMERICAN: >60
GLOBULIN: 2.4 G/DL
GLUCOSE BLD-MCNC: 105 MG/DL (ref 70–99)
GLUCOSE URINE: NEGATIVE MG/DL
HCT VFR BLD CALC: 37.2 % (ref 36–48)
HEMOGLOBIN: 12.7 G/DL (ref 12–16)
INFLUENZA A: NOT DETECTED
INFLUENZA B: NOT DETECTED
KETONES, URINE: NEGATIVE MG/DL
LACTIC ACID: 0.7 MMOL/L (ref 0.4–2)
LEUKOCYTE ESTERASE, URINE: NEGATIVE
LIPASE: 23 U/L (ref 13–60)
LYMPHOCYTES ABSOLUTE: 0.9 K/UL (ref 1–5.1)
LYMPHOCYTES RELATIVE PERCENT: 17.2 %
MCH RBC QN AUTO: 31.4 PG (ref 26–34)
MCHC RBC AUTO-ENTMCNC: 34 G/DL (ref 31–36)
MCV RBC AUTO: 92.2 FL (ref 80–100)
MICROSCOPIC EXAMINATION: NORMAL
MONOCYTES ABSOLUTE: 0.4 K/UL (ref 0–1.3)
MONOCYTES RELATIVE PERCENT: 8.3 %
NEUTROPHILS ABSOLUTE: 3.7 K/UL (ref 1.7–7.7)
NEUTROPHILS RELATIVE PERCENT: 72.7 %
NITRITE, URINE: NEGATIVE
PDW BLD-RTO: 13.4 % (ref 12.4–15.4)
PH UA: 7.5 (ref 5–8)
PLATELET # BLD: 199 K/UL (ref 135–450)
PMV BLD AUTO: 7 FL (ref 5–10.5)
POTASSIUM REFLEX MAGNESIUM: 4 MMOL/L (ref 3.5–5.1)
PRO-BNP: 161 PG/ML (ref 0–124)
PROCALCITONIN: 0.02 NG/ML (ref 0–0.15)
PROTEIN UA: NEGATIVE MG/DL
RBC # BLD: 4.03 M/UL (ref 4–5.2)
SARS-COV-2 RNA, RT PCR: NOT DETECTED
SODIUM BLD-SCNC: 135 MMOL/L (ref 136–145)
SPECIFIC GRAVITY UA: 1.01 (ref 1–1.03)
TOTAL PROTEIN: 6.5 G/DL (ref 6.4–8.2)
TROPONIN: <0.01 NG/ML
URINE TYPE: NORMAL
UROBILINOGEN, URINE: 0.2 E.U./DL
WBC # BLD: 5.1 K/UL (ref 4–11)

## 2021-05-13 PROCEDURE — 85025 COMPLETE CBC W/AUTO DIFF WBC: CPT

## 2021-05-13 PROCEDURE — 83690 ASSAY OF LIPASE: CPT

## 2021-05-13 PROCEDURE — 74177 CT ABD & PELVIS W/CONTRAST: CPT

## 2021-05-13 PROCEDURE — 6360000002 HC RX W HCPCS: Performed by: INTERNAL MEDICINE

## 2021-05-13 PROCEDURE — C9113 INJ PANTOPRAZOLE SODIUM, VIA: HCPCS | Performed by: INTERNAL MEDICINE

## 2021-05-13 PROCEDURE — 87636 SARSCOV2 & INF A&B AMP PRB: CPT

## 2021-05-13 PROCEDURE — 6370000000 HC RX 637 (ALT 250 FOR IP): Performed by: INTERNAL MEDICINE

## 2021-05-13 PROCEDURE — 6370000000 HC RX 637 (ALT 250 FOR IP): Performed by: PHYSICIAN ASSISTANT

## 2021-05-13 PROCEDURE — 85379 FIBRIN DEGRADATION QUANT: CPT

## 2021-05-13 PROCEDURE — 81003 URINALYSIS AUTO W/O SCOPE: CPT

## 2021-05-13 PROCEDURE — 83880 ASSAY OF NATRIURETIC PEPTIDE: CPT

## 2021-05-13 PROCEDURE — 2580000003 HC RX 258: Performed by: INTERNAL MEDICINE

## 2021-05-13 PROCEDURE — 6360000004 HC RX CONTRAST MEDICATION: Performed by: PHYSICIAN ASSISTANT

## 2021-05-13 PROCEDURE — 80053 COMPREHEN METABOLIC PANEL: CPT

## 2021-05-13 PROCEDURE — 1200000000 HC SEMI PRIVATE

## 2021-05-13 PROCEDURE — 71045 X-RAY EXAM CHEST 1 VIEW: CPT

## 2021-05-13 PROCEDURE — 36415 COLL VENOUS BLD VENIPUNCTURE: CPT

## 2021-05-13 PROCEDURE — 96361 HYDRATE IV INFUSION ADD-ON: CPT

## 2021-05-13 PROCEDURE — 2580000003 HC RX 258: Performed by: PHYSICIAN ASSISTANT

## 2021-05-13 PROCEDURE — 96375 TX/PRO/DX INJ NEW DRUG ADDON: CPT

## 2021-05-13 PROCEDURE — 83605 ASSAY OF LACTIC ACID: CPT

## 2021-05-13 PROCEDURE — 84145 PROCALCITONIN (PCT): CPT

## 2021-05-13 PROCEDURE — 76705 ECHO EXAM OF ABDOMEN: CPT

## 2021-05-13 PROCEDURE — 6360000002 HC RX W HCPCS: Performed by: PHYSICIAN ASSISTANT

## 2021-05-13 PROCEDURE — 99285 EMERGENCY DEPT VISIT HI MDM: CPT

## 2021-05-13 PROCEDURE — 96376 TX/PRO/DX INJ SAME DRUG ADON: CPT

## 2021-05-13 PROCEDURE — 93005 ELECTROCARDIOGRAM TRACING: CPT | Performed by: PHYSICIAN ASSISTANT

## 2021-05-13 PROCEDURE — 84484 ASSAY OF TROPONIN QUANT: CPT

## 2021-05-13 PROCEDURE — 99223 1ST HOSP IP/OBS HIGH 75: CPT | Performed by: PHYSICIAN ASSISTANT

## 2021-05-13 PROCEDURE — 96374 THER/PROPH/DIAG INJ IV PUSH: CPT

## 2021-05-13 PROCEDURE — 93010 ELECTROCARDIOGRAM REPORT: CPT | Performed by: INTERNAL MEDICINE

## 2021-05-13 RX ORDER — PANTOPRAZOLE SODIUM 40 MG/10ML
40 INJECTION, POWDER, LYOPHILIZED, FOR SOLUTION INTRAVENOUS 2 TIMES DAILY
Status: DISCONTINUED | OUTPATIENT
Start: 2021-05-13 | End: 2021-05-16 | Stop reason: HOSPADM

## 2021-05-13 RX ORDER — ACETAMINOPHEN 650 MG/1
650 SUPPOSITORY RECTAL EVERY 6 HOURS PRN
Status: DISCONTINUED | OUTPATIENT
Start: 2021-05-13 | End: 2021-05-16 | Stop reason: HOSPADM

## 2021-05-13 RX ORDER — SODIUM CHLORIDE 9 MG/ML
INJECTION, SOLUTION INTRAVENOUS CONTINUOUS
Status: DISCONTINUED | OUTPATIENT
Start: 2021-05-13 | End: 2021-05-16 | Stop reason: HOSPADM

## 2021-05-13 RX ORDER — MAGNESIUM SULFATE 1 G/100ML
1000 INJECTION INTRAVENOUS PRN
Status: DISCONTINUED | OUTPATIENT
Start: 2021-05-13 | End: 2021-05-16 | Stop reason: HOSPADM

## 2021-05-13 RX ORDER — HYDRALAZINE HYDROCHLORIDE 20 MG/ML
5 INJECTION INTRAMUSCULAR; INTRAVENOUS EVERY 6 HOURS PRN
Status: DISCONTINUED | OUTPATIENT
Start: 2021-05-13 | End: 2021-05-16 | Stop reason: HOSPADM

## 2021-05-13 RX ORDER — PROPRANOLOL HYDROCHLORIDE 10 MG/1
10 TABLET ORAL 2 TIMES DAILY
COMMUNITY

## 2021-05-13 RX ORDER — 0.9 % SODIUM CHLORIDE 0.9 %
1000 INTRAVENOUS SOLUTION INTRAVENOUS ONCE
Status: COMPLETED | OUTPATIENT
Start: 2021-05-13 | End: 2021-05-13

## 2021-05-13 RX ORDER — MORPHINE SULFATE 2 MG/ML
2 INJECTION, SOLUTION INTRAMUSCULAR; INTRAVENOUS ONCE
Status: COMPLETED | OUTPATIENT
Start: 2021-05-13 | End: 2021-05-13

## 2021-05-13 RX ORDER — ONDANSETRON 2 MG/ML
4 INJECTION INTRAMUSCULAR; INTRAVENOUS EVERY 6 HOURS PRN
Status: DISCONTINUED | OUTPATIENT
Start: 2021-05-13 | End: 2021-05-16 | Stop reason: HOSPADM

## 2021-05-13 RX ORDER — FAMOTIDINE 20 MG/1
20 TABLET, FILM COATED ORAL ONCE
Status: COMPLETED | OUTPATIENT
Start: 2021-05-13 | End: 2021-05-13

## 2021-05-13 RX ORDER — POLYETHYLENE GLYCOL 3350 17 G/17G
17 POWDER, FOR SOLUTION ORAL DAILY PRN
Status: DISCONTINUED | OUTPATIENT
Start: 2021-05-13 | End: 2021-05-16 | Stop reason: HOSPADM

## 2021-05-13 RX ORDER — SODIUM CHLORIDE 9 MG/ML
25 INJECTION, SOLUTION INTRAVENOUS PRN
Status: DISCONTINUED | OUTPATIENT
Start: 2021-05-13 | End: 2021-05-16 | Stop reason: HOSPADM

## 2021-05-13 RX ORDER — POTASSIUM CHLORIDE 20 MEQ/1
40 TABLET, EXTENDED RELEASE ORAL PRN
Status: DISCONTINUED | OUTPATIENT
Start: 2021-05-13 | End: 2021-05-16 | Stop reason: HOSPADM

## 2021-05-13 RX ORDER — SODIUM CHLORIDE 0.9 % (FLUSH) 0.9 %
5-40 SYRINGE (ML) INJECTION EVERY 12 HOURS SCHEDULED
Status: DISCONTINUED | OUTPATIENT
Start: 2021-05-13 | End: 2021-05-16 | Stop reason: HOSPADM

## 2021-05-13 RX ORDER — SODIUM CHLORIDE 0.9 % (FLUSH) 0.9 %
5-40 SYRINGE (ML) INJECTION PRN
Status: DISCONTINUED | OUTPATIENT
Start: 2021-05-13 | End: 2021-05-16 | Stop reason: HOSPADM

## 2021-05-13 RX ORDER — LOSARTAN POTASSIUM 25 MG/1
50 TABLET ORAL DAILY
Status: DISCONTINUED | OUTPATIENT
Start: 2021-05-14 | End: 2021-05-16 | Stop reason: HOSPADM

## 2021-05-13 RX ORDER — PROPRANOLOL HYDROCHLORIDE 10 MG/1
10 TABLET ORAL 3 TIMES DAILY
Status: DISCONTINUED | OUTPATIENT
Start: 2021-05-13 | End: 2021-05-16 | Stop reason: HOSPADM

## 2021-05-13 RX ORDER — ACETAMINOPHEN 325 MG/1
650 TABLET ORAL EVERY 6 HOURS PRN
Status: DISCONTINUED | OUTPATIENT
Start: 2021-05-13 | End: 2021-05-16 | Stop reason: HOSPADM

## 2021-05-13 RX ORDER — ONDANSETRON 2 MG/ML
4 INJECTION INTRAMUSCULAR; INTRAVENOUS ONCE
Status: COMPLETED | OUTPATIENT
Start: 2021-05-13 | End: 2021-05-13

## 2021-05-13 RX ORDER — PROMETHAZINE HYDROCHLORIDE 25 MG/1
12.5 TABLET ORAL EVERY 6 HOURS PRN
Status: DISCONTINUED | OUTPATIENT
Start: 2021-05-13 | End: 2021-05-16 | Stop reason: HOSPADM

## 2021-05-13 RX ORDER — POTASSIUM CHLORIDE 7.45 MG/ML
10 INJECTION INTRAVENOUS PRN
Status: DISCONTINUED | OUTPATIENT
Start: 2021-05-13 | End: 2021-05-16 | Stop reason: HOSPADM

## 2021-05-13 RX ORDER — DOCUSATE SODIUM 100 MG/1
100 CAPSULE, LIQUID FILLED ORAL DAILY
Status: DISCONTINUED | OUTPATIENT
Start: 2021-05-14 | End: 2021-05-16 | Stop reason: HOSPADM

## 2021-05-13 RX ORDER — POLYETHYLENE GLYCOL 3350 17 G/17G
17 POWDER, FOR SOLUTION ORAL DAILY
Status: DISCONTINUED | OUTPATIENT
Start: 2021-05-14 | End: 2021-05-16 | Stop reason: HOSPADM

## 2021-05-13 RX ORDER — ONDANSETRON 4 MG/1
4 TABLET, ORALLY DISINTEGRATING ORAL ONCE
Status: COMPLETED | OUTPATIENT
Start: 2021-05-13 | End: 2021-05-13

## 2021-05-13 RX ADMIN — MORPHINE SULFATE 2 MG: 2 INJECTION, SOLUTION INTRAMUSCULAR; INTRAVENOUS at 12:04

## 2021-05-13 RX ADMIN — ONDANSETRON 4 MG: 4 TABLET, ORALLY DISINTEGRATING ORAL at 16:03

## 2021-05-13 RX ADMIN — MORPHINE SULFATE 2 MG: 2 INJECTION, SOLUTION INTRAMUSCULAR; INTRAVENOUS at 16:03

## 2021-05-13 RX ADMIN — Medication 10 ML: at 22:06

## 2021-05-13 RX ADMIN — HYDROMORPHONE HYDROCHLORIDE 0.25 MG: 1 INJECTION, SOLUTION INTRAMUSCULAR; INTRAVENOUS; SUBCUTANEOUS at 20:48

## 2021-05-13 RX ADMIN — LIDOCAINE HYDROCHLORIDE: 20 SOLUTION ORAL; TOPICAL at 16:59

## 2021-05-13 RX ADMIN — ONDANSETRON HYDROCHLORIDE 4 MG: 2 INJECTION, SOLUTION INTRAMUSCULAR; INTRAVENOUS at 10:32

## 2021-05-13 RX ADMIN — SODIUM CHLORIDE: 9 INJECTION, SOLUTION INTRAVENOUS at 22:06

## 2021-05-13 RX ADMIN — IOPAMIDOL 75 ML: 755 INJECTION, SOLUTION INTRAVENOUS at 11:32

## 2021-05-13 RX ADMIN — PROMETHAZINE HYDROCHLORIDE 12.5 MG: 25 TABLET ORAL at 20:42

## 2021-05-13 RX ADMIN — FAMOTIDINE 20 MG: 20 TABLET ORAL at 12:29

## 2021-05-13 RX ADMIN — PANTOPRAZOLE SODIUM 40 MG: 40 INJECTION, POWDER, FOR SOLUTION INTRAVENOUS at 22:13

## 2021-05-13 RX ADMIN — SODIUM CHLORIDE 1000 ML: 9 INJECTION, SOLUTION INTRAVENOUS at 11:56

## 2021-05-13 RX ADMIN — PROPRANOLOL HYDROCHLORIDE 10 MG: 10 TABLET ORAL at 22:06

## 2021-05-13 ASSESSMENT — PAIN DESCRIPTION - LOCATION
LOCATION: ABDOMEN
LOCATION: ABDOMEN

## 2021-05-13 ASSESSMENT — PAIN SCALES - GENERAL
PAINLEVEL_OUTOF10: 3
PAINLEVEL_OUTOF10: 6
PAINLEVEL_OUTOF10: 1

## 2021-05-13 ASSESSMENT — PAIN DESCRIPTION - PAIN TYPE
TYPE: ACUTE PAIN
TYPE: ACUTE PAIN

## 2021-05-13 NOTE — PLAN OF CARE
69-year-old female with intractable abdominal pain, associated with nausea and vomiting. Work-up in ER-negative dimer, negative troponin. CT abdomen-nonobstructing left nephrolithiasis  Right upper quadrant ultrasound-distended gallbladder, possible acalculous cholecystitis. Patient with persistent pain in the ED ,sensitive to morphine    Admit to [facility]  GI consult  IV Protonix  Check HIDA scan. Might need EGD.    prn low dose dilaudid

## 2021-05-13 NOTE — ED PROVIDER NOTES
[address] ED  EMERGENCY DEPARTMENT ENCOUNTER        Pt Name: Abhay Pagan  MRN: [id number]  [place] 1950  Date of evaluation: 5/13/2021  Provider: PRANAV Ordonez  PCP: GARY Pardo - CNP    This patient was seen and evaluated by the attending physician Sahra Villanueva, [facility]       Chief Complaint   Patient presents with    Abdominal Pain     epigastric pain started yesterday after caratact removal; vomited this morning; pain relieved after emesis       HISTORY OF PRESENT ILLNESS   (Location/Symptom, Timing/Onset, Context/Setting, Quality, Duration, Modifying Factors, Severity)  Note limiting factors. Abhay Pagan is a 69 y.o. female with past medical history of endometrial cancer 5 years ago, hypertension, hyperlipidemia history of blood clots who presents via private vehicle from her home for evaluation of epigastric abdominal pain. Patient notes that yesterday she had a cataract removed. She was given some medicine from the procedure she is not sure what. She notes that she went home felt fine when she woke up from her nap she had epigastric discomfort that she describes as a pressure. She woke up early this morning and she was having pain and pressure that was relieved with emesis. She is had one episode of emesis no hematemesis. She notes that since vomiting the pain has progressively returned, and again she describes it as a pressure. It radiates up into the lower part of the chest.  She denies any overt chest pain no difficulty breathing no shortness of breath. She notes she does have a history of small bowel obstruction however she did have a bowel movement this morning, she is not passed any gas since but denies any lower abdominal pain. She denies excessive belching. She denies any eye pain or drainage. She has tried Tums without significant improvement in her symptoms.        Nursing Notes were all reviewed and agreed with or any disagreements were addressed  in the HPI. Pt was seen during the [place] 19 pandemic. Appropriate PPE worn by ME during patient encounters. Pt seen during a time with constrained hospital bed capacity and other potential inpatient and outpatient resources were constrained due to the viral pandemic. REVIEW OF SYSTEMS    (2-9 systems for level 4, 10 or more for level 5)     Review of Systems    Positives and Pertinent negatives as per HPI. Except as noted abovein the ROS, all other systems were reviewed and negative. PAST MEDICAL HISTORY     Past Medical History:   Diagnosis Date    Cancer [facility])     endometrial, last chemo 5 yrs ago    Clostridium difficile infection 4/2/14    Hx of blood clots     Hyperlipidemia     Hypertension     Migraines          SURGICAL HISTORY     Past Surgical History:   Procedure Laterality Date    HYSTERECTOMY      OVARY REMOVAL N/A 2008    TUNNELED VENOUS PORT PLACEMENT Right removal    PORT A CATH REMOVAL RIGHT CHEST                CURRENTMEDICATIONS       Previous Medications    ACETAMINOPHEN (TYLENOL) 325 MG TABLET    Take 650 mg by mouth every 6 hours as needed for Pain    ATORVASTATIN (LIPITOR) 20 MG TABLET    Take 20 mg by mouth daily    FLUOXETINE (PROZAC) 20 MG CAPSULE    Take 10 mg by mouth daily     IBUPROFEN (ADVIL;MOTRIN) 200 MG TABLET    Take 200 mg by mouth every 6 hours as needed for Pain    LOSARTAN (COZAAR) 25 MG TABLET    Take 50 mg by mouth daily     PROPRANOLOL (INDERAL) 10 MG TABLET    Take 10 mg by mouth 3 times daily         ALLERGIES     Patient has no known allergies.     FAMILYHISTORY       Family History   Problem Relation Age of Onset   Buelah Ring[place] Cancer Mother         colon    Hypertension Mother     High Cholesterol Mother     High Cholesterol Father           SOCIAL HISTORY       Social History     Socioeconomic History    Marital status:      Spouse name: Not on file    Number of children: Not on file    Years of education: Not on file    Highest education level: Not on file   Occupational History    Not on file   Social Needs    Financial resource strain: Not on file    Food insecurity     Worry: Not on file     Inability: Not on file    Transportation needs     Medical: Not on file     Non-medical: Not on file   Tobacco Use    Smoking status: Never Smoker    Smokeless tobacco: Never Used   Substance and Sexual Activity    Alcohol use: Yes     Comment: occasionally    Drug use: No    Sexual activity: Not on file   Lifestyle    Physical activity     Days per week: Not on file     Minutes per session: Not on file    Stress: Not on file   Relationships    Social connections     Talks on phone: Not on file     Gets together: Not on file     Attends Sikhism service: Not on file     Active member of club or organization: Not on file     Attends meetings of clubs or organizations: Not on file     Relationship status: Not on file    Intimate partner violence     Fear of current or ex partner: Not on file     Emotionally abused: Not on file     Physically abused: Not on file     Forced sexual activity: Not on file   Other Topics Concern    Not on file   Social History Narrative    Not on file       SCREENINGS    Debora Coma Scale  Eye Opening: Spontaneous  Best Verbal Response: Oriented  Best Motor Response: Obeys commands  Rumson Coma Scale Score: 15        PHYSICAL EXAM    (up to 7 for level 4, 8 or more for level 5)     ED Triage Vitals [05/13/21 0949]   BP Temp Temp Source Pulse Resp SpO2 Height Weight   138/87 97.9 °F (36.6 °C) Oral 55 16 100 % 5' 3\" (1.6 m) 139 lb (63 kg)       Physical Exam  Vitals signs and nursing note reviewed. Constitutional:       General: She is awake. She is not in acute distress. Appearance: Normal appearance. She is well-developed. She is not ill-appearing, toxic-appearing or diaphoretic. HENT:      Head: Normocephalic and atraumatic.       Right Ear: External ear normal.      Left Ear: External ear normal.      Nose: Nose normal.      Mouth/Throat:      Mouth: Mucous membranes are moist.      Pharynx: Oropharynx is clear. Eyes:      General:         Right eye: No discharge. Left eye: No discharge. Extraocular Movements: Extraocular movements intact. Pupils: Pupils are equal, round, and reactive to light. Neck:      Musculoskeletal: Normal range of motion and neck supple. No neck rigidity. Cardiovascular:      Rate and Rhythm: Normal rate and regular rhythm. Pulses: Normal pulses. Heart sounds: Normal heart sounds. No murmur. No friction rub. No gallop. Pulmonary:      Effort: Pulmonary effort is normal. No respiratory distress. Breath sounds: Normal breath sounds. No wheezing or rales. Abdominal:      General: Bowel sounds are normal. There is no distension. Palpations: Abdomen is soft. Tenderness: There is abdominal tenderness in the right upper quadrant, epigastric area and left upper quadrant. There is no right CVA tenderness, left CVA tenderness, guarding or rebound. Hernia: No hernia is present. Musculoskeletal:      Right lower leg: No edema. Left lower leg: No edema. Lymphadenopathy:      Cervical: No cervical adenopathy. Skin:     General: Skin is warm and dry. Capillary Refill: Capillary refill takes less than 2 seconds. Coloration: Skin is not jaundiced or pale. Findings: No rash. Neurological:      General: No focal deficit present. Mental Status: She is alert, oriented to person, place, and time and easily aroused. Sensory: No sensory deficit. Motor: No weakness. Psychiatric:         Mood and Affect: Mood normal.         Behavior: Behavior normal. Behavior is cooperative.            DIAGNOSTIC RESULTS   LABS:    Labs Reviewed   CBC WITH AUTO DIFFERENTIAL - Abnormal; Notable for the following components:       Result Value    Lymphocytes Absolute 0.9 (*)     All other components within normal limits    Narrative:     Performed at:  [facility],  [place] [facility]   Phone [phone]   COMPREHENSIVE METABOLIC PANEL W/ REFLEX TO MG FOR LOW K - Abnormal; Notable for the following components:    Sodium 135 (*)     Glucose 105 (*)     All other components within normal limits    Narrative:     Performed at:  [facility],  [place] [facility]   Phone [phone]   BRAIN NATRIURETIC PEPTIDE - Abnormal; Notable for the following components:    Pro- (*)     All other components within normal limits    Narrative:     Performed at:  [facility] [address],  [place], [place] [facility]   Phone [phone]   TROPONIN    Narrative:     Performed at:  [facility] [address],  [place], [facility]   Phone [phone]   LIPASE    Narrative:     Performed at:  [facility] [address],  [place], [facility]   Phone [phone]   URINALYSIS    Narrative:     Performed at:  [facility],  [place], [place]   Phone [phone]   LACTIC ACID, PLASMA    Narrative:     Performed at:  [facility],  [place], [facility]   Phone [phone]       All other labs were within normal range or not returned as of this dictation. EKG: All EKG's are interpreted by the Emergency Department Physician who either signs orCo-signs this chart in the absence of a cardiologist.  Please see their note for interpretation of EKG.       RADIOLOGY:   Non-plain film images such as CT, Ultrasound and MRI are read by the radiologist. Plain radiographic images are visualized andpreliminarily interpreted by the  ED Provider with the below findings:        Interpretation perthe Radiologist below, if available at the time of this note:    CT ABDOMEN PELVIS W IV CONTRAST Additional Contrast? None   Final Result   Nonobstructing left nephrolithiasis. XR CHEST PORTABLE   Final Result   No acute cardiopulmonary disease. US GALLBLADDER RUQ    (Results Pending)     Ct Abdomen Pelvis W Iv Contrast Additional Contrast? None    Result Date: 5/13/2021  EXAMINATION: CT OF THE ABDOMEN AND PELVIS WITH CONTRAST 5/13/2021 11:25 am TECHNIQUE: CT of the abdomen and pelvis was performed with the administration of intravenous contrast. Multiplanar reformatted images are provided for review. Dose modulation, iterative reconstruction, and/or weight based adjustment of the mA/kV was utilized to reduce the radiation dose to as low as reasonably achievable. COMPARISON: 10/15/2019 HISTORY: ORDERING SYSTEM PROVIDED HISTORY: abd pain TECHNOLOGIST PROVIDED HISTORY: Reason for exam:->abd pain Additional Contrast?->None Decision Support Exception - unselect if not a suspected or confirmed emergency medical condition->Emergency Medical Condition (MA) Reason for Exam: abdominal pain , epigastric with NV Acuity: Acute Type of Exam: Initial FINDINGS: Lower Chest: There is no consolidation or effusion. Organs: The liver, pancreas, spleen, kidneys and adrenals are unremarkable aside from a nonobstructing 3 mm left intrarenal calculus. GI/Bowel: There is no bowel dilatation, wall thickening or obstruction. The appendix is normal. Pelvis: The uterus is surgically absent. The bladder is unremarkable. Peritoneum/Retroperitoneum: There is no free air, free fluid or intraperitoneal inflammatory change. There is no adenopathy. Bones/Soft Tissues: There is no acute fracture or aggressive osseous lesion. Nonobstructing left nephrolithiasis. Xr Chest Portable    Result Date: 5/13/2021  EXAMINATION: ONE XRAY VIEW OF THE CHEST 5/13/2021 10:32 am COMPARISON: None.  HISTORY: ORDERING SYSTEM PROVIDED HISTORY: epigastric abd pain TECHNOLOGIST PROVIDED HISTORY: Reason for exam:->epigastric abd pain Reason for Exam: Severe epigastric pain since last p.m. JRD Acuity: Acute Type of Exam: Initial FINDINGS: The cardiomediastinal silhouette is of normal size. The lungs are grossly clear. There is no pleural effusion. There is no pneumothorax. There is no acute osseous abnormality. No acute cardiopulmonary disease. PROCEDURES   Unless otherwise noted below, none     Procedures    CRITICAL CARE TIME   N/A    CONSULTS:  None      EMERGENCY DEPARTMENT COURSE and DIFFERENTIALDIAGNOSIS/MDM:   Vitals:    Vitals:    05/13/21 0949 05/13/21 1033 05/13/21 1225   BP: 138/87 (!) 168/84 (!) 176/86   Pulse: 55 55 60   Resp: 16 10 15   Temp: 97.9 °F (36.6 °C)     TempSrc: Oral     SpO2: 100% 100% 98%   Weight: 139 lb (63 kg)     Height: 5' 3\" (1.6 m)         Patient was given thefollowing medications:  Medications   ondansetron (ZOFRAN) injection 4 mg (4 mg Intravenous Given 5/13/21 1032)   0.9 % sodium chloride bolus (1,000 mLs Intravenous New Bag 5/13/21 1156)   morphine (PF) injection 2 mg (2 mg Intravenous Given 5/13/21 1204)   iopamidol (ISOVUE-370) 76 % injection 75 mL (75 mLs Intravenous Given 5/13/21 1132)   famotidine (PEPCID) tablet 20 mg (20 mg Oral Given 5/13/21 1229)       PDMP Monitoring:    Last PDMP Je as Reviewed [facility]):  Review User Review Instant Review Result            Urine Drug Screenings (1 yr)     No resulted procedures found. Medication Contract and Consent for Opioid Use Documents Filed      No documents found                MDM:   Patient seen and evaluated. Old records reviewed. Diagnostic testing reviewed and results discussed. Patient is a pleasant 67-year-old female who presents for evaluation of epigastric abdominal pain. On exam she is alert she is oriented afebrile well-perfused hemodynamically stable nontoxic. She has tenderness to palpation to the right upper quadrant epigastric region and left upper quadrant. Note rebound guarding or other peritoneal signs. She is nauseated however nausea and pain improved with IV morphine and Zofran. The remainder of her physical exam is without focal abnormality. Differentials at this time include gastritis,: Cystitis, GERD, less likely referred pain from ACS less likely pneumonia less likely PE.  CBC is without focal abnormality. CMP is reassuring no significant electrolyte derangements and hepatic and renal function is maintained. Her LFTs are normal.    Her troponin is negative. I have low concern for ACS. BNP is mildly elevated. I have low concern for CHF or significant right heart strain at this time. Her dimer is also negative I have low concern that this is referred pain from PE. Covid is negative. Urinalysis is negative. Chest x-ray is negative. CT is negative aside from left nonobstructing left nephrolithiasis. Right upper quadrant ultrasound reveals sonographic Vu sign with borderline distended gallbladder, consideration for a calculus cholecystitis. Common bile duct measuring within normal limits at 5 mm I have low concern for cholangitis or choledocholithiasis. These results were discussed with patient. Unfortunately I was unable to get the patient's pain adequately under control while in the department, she did not tolerate additional morphine and had severe nausea with it despite codosing it with antiemetic. I discussed these results with gastroenterology on-call, will plan for MRCP. I also discussed with general surgery on-call, Dr. Laine Martins who will follow. At this time I advocate the patient be admitted. All information including ED workup, results, treatment, diagnosis has been reviewed and discussed with ED attending physician and directly discussed with Hospitalist who is the admitting physician. Pt will be admitted in stable condition. Pt advised of admission and is in full agreement. FINAL IMPRESSION      1. Pain of upper abdomen    2. Abnormal gallbladder ultrasound          DISPOSITION/PLAN   DISPOSITION        PATIENT REFERREDTO:  No follow-up provider specified.     DISCHARGE MEDICATIONS:  New Prescriptions    No medications on file       DISCONTINUED MEDICATIONS:  Discontinued Medications    No medications on file              (Please note that portions ofthis note were completed with a voice recognition program.  Efforts were made to edit the dictations but occasionally words are mis-transcribed.)    Natanael Madrid (electronically signed)        Natanael Madrid  05/13/21 1954

## 2021-05-13 NOTE — H&P
Hospital Medicine History & Physical      PCP: Bev Colbert, APRN - CNP    Date of Admission: 5/13/2021    Date of Service: Pt seen/examined on 5/13/2021    Chief Complaint:    Chief Complaint   Patient presents with    Abdominal Pain     epigastric pain started yesterday after caratact removal; vomited this morning; pain relieved after emesis         History Of Present Illness: The patient is a 69 y.o. female with a PMH of Endometrial Cancer s/p hysterectomy, HLD, HTN who presented to [facility] ED with complaint of RUQ and epigastric abdominal pain with associated nausea and emesis x 1. Pt states she had cataract surgery yesterday and following the procedure she did not feel well. She woke up this morning and noted the pain. Reported having vegetable soup yesterday for dinner and no breakfast this morning apart from her coffee which she reports she threw up. She took a tums which did not help. Due to worsening pain, she decided to go to the ER. Denies any fevers, chills, nausea, diarrhea or constipation. Her last BM was this AM which was small, soft and brown w/o melena or hematochezia. Denies any radiation of pain symptoms. No hx of gallbladder disease or malignancy. Denies hx of PUD, NSAID use, family hx of gallstone disease, tobacco abuse or hx of working with chemicals used in the rubber and textile industry. She does report a hx of endometrial cancer s/p complete hysterectomy and chemotherapy. She is in remission at this time.     Past Medical History:        Diagnosis Date    Cancer [facility])     endometrial, last chemo 5 yrs ago    Clostridium difficile infection 4/2/14    Hx of blood clots     Hyperlipidemia     Hypertension     Migraines        Past Surgical History:        Procedure Laterality Date    HYSTERECTOMY      OVARY REMOVAL N/A 2008    TUNNELED VENOUS PORT PLACEMENT Right removal    PORT A CATH REMOVAL RIGHT CHEST              Medications Prior to Admission:    Prior to Admission conjunctival injection. Neck: Trachea midline. Resp: No accessory muscle use. No crackles. No wheezes. No rhonchi. On RA  CV: Regular rate. Regular rhythm. No murmur. No rub. No edema. Capillary Refill: Brisk,< 3 seconds   Peripheral Pulses: +2 palpable, equal bilaterally   GI: Soft, RUQ tenderness with deep palpation, + Vu's sign, Non-distended. Normal bowel sounds. Skin: Warm and dry. Decreased skin turgor. No rash on exposed extremities. Neuro: Awake. Grossly non-focal, moves all extremities, follows commands, no dysarthria   Psych: Oriented x 3. No anxiety or agitation. CBC:   Recent Labs     05/13/21  1006   WBC 5.1   HGB 12.7   HCT 37.2   MCV 92.2        BMP:   Recent Labs     05/13/21  1006   *   K 4.0      CO2 27   BUN 12   CREATININE 0.7     LIVER PROFILE:   Recent Labs     05/13/21  1006   AST 22   ALT 16   LIPASE 23.0   BILITOT 0.3   ALKPHOS 44     UA:  Recent Labs     05/13/21  1130   COLORU Yellow   PHUR 7.5   CLARITYU Clear   SPECGRAV 1.015   LEUKOCYTESUR Negative   UROBILINOGEN 0.2   BILIRUBINUR Negative   BLOODU Negative   GLUCOSEU Negative        CARDIAC ENZYMES  Recent Labs     05/13/21  1006   TROPONINI <0.01       U/A:    Lab Results   Component Value Date    COLORU Yellow 05/13/2021    WBCUA None seen 01/06/2019    RBCUA None seen 01/06/2019    BACTERIA Rare 11/28/2017    CLARITYU Clear 05/13/2021    SPECGRAV 1.015 05/13/2021    LEUKOCYTESUR Negative 05/13/2021    BLOODU Negative 05/13/2021    GLUCOSEU Negative 05/13/2021    GLUCOSEU NEGATIVE 01/12/2010     CULTURES    COVID 19 RAPID PCR: pending    Rapid Influenza A/B: pending      EKG:  I have reviewed the EKG with the following interpretation:   Marked sinus bradycardia rate of 48  Abnormal ECG  Confirmed by RADHA BARRIOS, [address] ([id number]) on 5/13/2021 2:01:40 PM     [address]   Final Result   1. Sonographic Vu side with borderline distended gallbladder.   No   obstructing gallstones

## 2021-05-13 NOTE — ED NOTES
Patient taken to ultrasound via wheelchair in stable condition via transport     Giovanny Leo RN  05/13/21 3298

## 2021-05-14 ENCOUNTER — APPOINTMENT (OUTPATIENT)
Dept: NUCLEAR MEDICINE | Age: 71
DRG: 446 | End: 2021-05-14
Payer: MEDICARE

## 2021-05-14 LAB
BASOPHILS ABSOLUTE: 0 K/UL (ref 0–0.2)
BASOPHILS RELATIVE PERCENT: 0.2 %
EOSINOPHILS ABSOLUTE: 0 K/UL (ref 0–0.6)
EOSINOPHILS RELATIVE PERCENT: 0.4 %
HCT VFR BLD CALC: 36.8 % (ref 36–48)
HEMOGLOBIN: 12.4 G/DL (ref 12–16)
LYMPHOCYTES ABSOLUTE: 1.3 K/UL (ref 1–5.1)
LYMPHOCYTES RELATIVE PERCENT: 18.1 %
MCH RBC QN AUTO: 31.6 PG (ref 26–34)
MCHC RBC AUTO-ENTMCNC: 33.7 G/DL (ref 31–36)
MCV RBC AUTO: 93.7 FL (ref 80–100)
MONOCYTES ABSOLUTE: 0.6 K/UL (ref 0–1.3)
MONOCYTES RELATIVE PERCENT: 8.8 %
NEUTROPHILS ABSOLUTE: 5.2 K/UL (ref 1.7–7.7)
NEUTROPHILS RELATIVE PERCENT: 72.5 %
PDW BLD-RTO: 13.7 % (ref 12.4–15.4)
PLATELET # BLD: 211 K/UL (ref 135–450)
PMV BLD AUTO: 7 FL (ref 5–10.5)
RBC # BLD: 3.93 M/UL (ref 4–5.2)
WBC # BLD: 7.2 K/UL (ref 4–11)

## 2021-05-14 PROCEDURE — 6370000000 HC RX 637 (ALT 250 FOR IP): Performed by: PHYSICIAN ASSISTANT

## 2021-05-14 PROCEDURE — 99232 SBSQ HOSP IP/OBS MODERATE 35: CPT | Performed by: INTERNAL MEDICINE

## 2021-05-14 PROCEDURE — 2580000003 HC RX 258: Performed by: INTERNAL MEDICINE

## 2021-05-14 PROCEDURE — A9537 TC99M MEBROFENIN: HCPCS | Performed by: INTERNAL MEDICINE

## 2021-05-14 PROCEDURE — 6360000002 HC RX W HCPCS: Performed by: INTERNAL MEDICINE

## 2021-05-14 PROCEDURE — 78226 HEPATOBILIARY SYSTEM IMAGING: CPT

## 2021-05-14 PROCEDURE — 1200000000 HC SEMI PRIVATE

## 2021-05-14 PROCEDURE — C9113 INJ PANTOPRAZOLE SODIUM, VIA: HCPCS | Performed by: INTERNAL MEDICINE

## 2021-05-14 PROCEDURE — 6360000002 HC RX W HCPCS: Performed by: PHYSICIAN ASSISTANT

## 2021-05-14 PROCEDURE — 85025 COMPLETE CBC W/AUTO DIFF WBC: CPT

## 2021-05-14 PROCEDURE — 6370000000 HC RX 637 (ALT 250 FOR IP): Performed by: INTERNAL MEDICINE

## 2021-05-14 PROCEDURE — 36415 COLL VENOUS BLD VENIPUNCTURE: CPT

## 2021-05-14 PROCEDURE — 3430000000 HC RX DIAGNOSTIC RADIOPHARMACEUTICAL: Performed by: INTERNAL MEDICINE

## 2021-05-14 RX ORDER — PREDNISOLONE ACETATE 10 MG/ML
2 SUSPENSION/ DROPS OPHTHALMIC 4 TIMES DAILY
Status: DISCONTINUED | OUTPATIENT
Start: 2021-05-14 | End: 2021-05-16 | Stop reason: HOSPADM

## 2021-05-14 RX ORDER — MOXIFLOXACIN 5 MG/ML
2 SOLUTION/ DROPS OPHTHALMIC 4 TIMES DAILY
Status: DISCONTINUED | OUTPATIENT
Start: 2021-05-14 | End: 2021-05-16 | Stop reason: HOSPADM

## 2021-05-14 RX ORDER — PREDNISOLONE ACETATE 10 MG/ML
2 SUSPENSION/ DROPS OPHTHALMIC 4 TIMES DAILY
COMMUNITY

## 2021-05-14 RX ORDER — MOXIFLOXACIN 5 MG/ML
2 SOLUTION/ DROPS OPHTHALMIC 4 TIMES DAILY
COMMUNITY

## 2021-05-14 RX ORDER — DICLOFENAC SODIUM 1 MG/ML
2 SOLUTION/ DROPS OPHTHALMIC 4 TIMES DAILY
Status: DISCONTINUED | OUTPATIENT
Start: 2021-05-14 | End: 2021-05-16 | Stop reason: HOSPADM

## 2021-05-14 RX ORDER — DICLOFENAC SODIUM 1 MG/ML
2 SOLUTION/ DROPS OPHTHALMIC 4 TIMES DAILY
COMMUNITY

## 2021-05-14 RX ADMIN — PROPRANOLOL HYDROCHLORIDE 10 MG: 10 TABLET ORAL at 20:43

## 2021-05-14 RX ADMIN — MOXIFLOXACIN 2 DROP: 5 SOLUTION/ DROPS OPHTHALMIC at 13:04

## 2021-05-14 RX ADMIN — PROPRANOLOL HYDROCHLORIDE 10 MG: 10 TABLET ORAL at 17:38

## 2021-05-14 RX ADMIN — Medication 10 ML: at 20:44

## 2021-05-14 RX ADMIN — DICLOFENAC SODIUM 2 DROP: 1 SOLUTION/ DROPS OPHTHALMIC at 20:43

## 2021-05-14 RX ADMIN — MOXIFLOXACIN 2 DROP: 5 SOLUTION/ DROPS OPHTHALMIC at 20:43

## 2021-05-14 RX ADMIN — PANTOPRAZOLE SODIUM 40 MG: 40 INJECTION, POWDER, FOR SOLUTION INTRAVENOUS at 07:55

## 2021-05-14 RX ADMIN — PREDNISOLONE ACETATE 2 DROP: 10 SUSPENSION/ DROPS OPHTHALMIC at 17:44

## 2021-05-14 RX ADMIN — PROPRANOLOL HYDROCHLORIDE 10 MG: 10 TABLET ORAL at 07:54

## 2021-05-14 RX ADMIN — ENOXAPARIN SODIUM 40 MG: 40 INJECTION SUBCUTANEOUS at 17:45

## 2021-05-14 RX ADMIN — LOSARTAN POTASSIUM 50 MG: 25 TABLET, FILM COATED ORAL at 07:55

## 2021-05-14 RX ADMIN — MOXIFLOXACIN 2 DROP: 5 SOLUTION/ DROPS OPHTHALMIC at 17:44

## 2021-05-14 RX ADMIN — HYDRALAZINE HYDROCHLORIDE 5 MG: 20 INJECTION INTRAMUSCULAR; INTRAVENOUS at 17:38

## 2021-05-14 RX ADMIN — PREDNISOLONE ACETATE 2 DROP: 10 SUSPENSION/ DROPS OPHTHALMIC at 13:05

## 2021-05-14 RX ADMIN — SODIUM CHLORIDE 1.33 MCG: 9 INJECTION, SOLUTION INTRAVENOUS at 13:48

## 2021-05-14 RX ADMIN — ACETAMINOPHEN 650 MG: 325 TABLET ORAL at 17:53

## 2021-05-14 RX ADMIN — PANTOPRAZOLE SODIUM 40 MG: 40 INJECTION, POWDER, FOR SOLUTION INTRAVENOUS at 20:43

## 2021-05-14 RX ADMIN — DICLOFENAC SODIUM 2 DROP: 1 SOLUTION/ DROPS OPHTHALMIC at 17:44

## 2021-05-14 RX ADMIN — PREDNISOLONE ACETATE 2 DROP: 10 SUSPENSION/ DROPS OPHTHALMIC at 20:43

## 2021-05-14 RX ADMIN — Medication 10 ML: at 07:55

## 2021-05-14 RX ADMIN — DICLOFENAC SODIUM 2 DROP: 1 SOLUTION/ DROPS OPHTHALMIC at 13:04

## 2021-05-14 RX ADMIN — Medication 5.4 MILLICURIE: at 15:07

## 2021-05-14 ASSESSMENT — PAIN SCALES - GENERAL: PAINLEVEL_OUTOF10: 8

## 2021-05-14 NOTE — CARE COORDINATION
Case Management Assessment  Initial Evaluation      Patient Name: Lester Moss  YOB: 1950  Diagnosis: Abdominal pain [R10.9]  Date / Time: 5/13/2021  9:41 AM    Admission status/Date: 5/13/21 inpt  Chart Reviewed: Yes      Patient Interviewed: Yes   Family Interviewed:  No      Hospitalization in the last 30 days:  No      Health Care Decision Maker :   Primary Decision Maker: Janel Janak - Spouse - [phone]    (CM - must 1st enter selection under Navigator - emergency contact- Health Care Decision Maker Relationship and pick relationship)   Who do you trust or have selected to make healthcare decisions for you      Met with:  Patient at bedside  Interview conducted  (bedside/phone):    Current PCP:  TOBIN LEONARD [address] [place] [address] required for SNF : Y          3 night stay required -  Arielle Thayer  Support Systems/Care Needs: Spouse/Significant Other  Transportation: self    Meal Preparation: self    Housing  Living Arrangements:  Lives 2 story home  Steps:  10 TE  Intent for return to present living arrangements: Yes  Identified Issues: [facility] with Home Health Care : No Agency:(Services)  Type of Home Care Services: None  Passport/Waiver : No  :                      Phone Number:    Passport/Waiver Services: N/A          Durable Medical Equiptment   DME Provider: N/A  Equipment: N/A  Walker___Cane___RTS___ BSC___Shower Chair___Hospital Bed___W/C____Other________  02 at ____Liter(s)---wears(frequency)_______ HHN ___ CPAP___ BiPap___   N/A____      Home O2 Use :  No    If No for home O2---if presently on O2 during hospitalization:  No  if yes CM to follow for potential DC O2 need  Informed of need for care provider to bring portable home O2 tank on day of discharge for nursing to connect prior to leaving:   Not Indicated  Verbalized agreement/Understanding:   Not Indicated    Community Service Affiliation  Dialysis:  No · Agency:  · Location:  · Dialysis Schedule:  · Phone:   · Fax: Other Community Services: (ex:PT/OT,Mental Health,Wound Clinic, Cardio/Pul [address]) None    DISCHARGE PLAN: Explained Case Management role/services. Reviewed chart and met with pt at bedside. Role of CM explained. Dustin lives home with spouse and is fully indep with all care. Anticipate no needs but will follow.

## 2021-05-14 NOTE — PROGRESS NOTES
Progress Note    Admit Date:  5/13/2021    Subjective:  Ms. Radha Eduardo feels much better today. abdominal pain is almost resolved. she still has some right upper quadrant tenderness on exam, denies any nausea or vomiting today. appetite is poor. currently n.p.o.   -> Due for a HIDA scan today. Objective:   BP (!) 154/89   Pulse 67   Temp 100.1 °F (37.8 °C) (Oral)   Resp 18   Ht 5' 3\" (1.6 m)   Wt 146 lb 3.2 oz (66.3 kg)   SpO2 96%   BMI 25.90 kg/m²   No intake or output data in the 24 hours ending 05/14/21 1051      Physical Exam:  General:  Awake, alert, NAD  Skin:  Warm and dry  Neck:  JVD absent. Neck supple  Chest:  Clear to auscultation, respiration easy. No wheezes, rales or rhonchi. Cardiovascular:  RRR ,S1S2 normal  Abdomen:  Soft, non distended, BS +  Right upper quadrant tender on exam  Extremities:  No edema. Intact peripheral pulses. Brisk cap refill, < 2 secs  Neuro: non focal      Medications:   Scheduled Meds:   prednisoLONE acetate  2 drop Left Eye 4x Daily    propranolol  10 mg Oral TID    losartan  50 mg Oral Daily    sodium chloride flush  5-40 mL Intravenous 2 times per day    enoxaparin  40 mg Subcutaneous Daily    pantoprazole  40 mg Intravenous BID    docusate sodium  100 mg Oral Daily    polyethylene glycol  17 g Oral Daily       Continuous Infusions:   sodium chloride 75 mL/hr at 05/13/21 2206    sodium chloride         Data:  CBC:   Recent Labs     05/13/21  1006 05/14/21  0520   WBC 5.1 7.2   RBC 4.03 3.93*   HGB 12.7 12.4   HCT 37.2 36.8   MCV 92.2 93.7   RDW 13.4 13.7    211     BMP:   Recent Labs     05/13/21  1006   *   K 4.0      CO2 27   BUN 12   CREATININE 0.7     BNP: No results for input(s): BNP in the last 72 hours. PT/INR: No results for input(s): PROTIME, INR in the last 72 hours. APTT: No results for input(s): APTT in the last 72 hours.   CARDIAC ENZYMES:   Recent Labs     05/13/21  1006   TROPONINI <0.01     FASTING LIPID PANEL:No results found for: CHOL, HDL, TRIG  LIVER PROFILE:   Recent Labs     05/13/21  1006   AST 22   ALT 16   BILITOT 0.3   ALKPHOS 44         CULTURES     COVID 19 RAPID PCR: pending     Rapid Influenza A/B: pending       EKG:  I have reviewed the EKG with the following interpretation:   Marked sinus bradycardia rate of 48  Abnormal ECG  Confirmed by RADHA BARRIOS, MADHURI ([id number]) on 5/13/2021 2:01:40 PM      RADIOLOGY    US GALLBLADDER RUQ   Final Result   1. Sonographic Vu side with borderline distended gallbladder. No   obstructing gallstones identified. Findings could be seen in acalculous   cholecystitis. Consider a HIDA scan for further confirmation. 2. The patient has gallbladder polyps, measuring less than 6 mm. If the   patient has risk factors for gallbladder carcinoma, follow-up ultrasound in 6   months is recommended. CT ABDOMEN PELVIS W IV CONTRAST Additional Contrast? None   Final Result   Nonobstructing left nephrolithiasis. XR CHEST PORTABLE   Final Result   No acute cardiopulmonary disease. NM HEPATOBILIARY SCAN W PHARMACOLOGICAL INTERVENTION    (Results Pending)         Assessment:  Active Problems:    Abdominal pain  Resolved Problems:    * No resolved hospital problems.  *      Plan:    Nausea and Vomiting  Intractable Abdominal Pain  - no leukocytosis, LFTs normal, pt is afebrile  - CT abdomen: non-acute; RUQ U/S: sonographic Vu sign with borderline distended gallbladder, no obstructing stone identified  - Admit to Med Surg  - pct normal   - check HIDA scan today  - NPO, IVF, PRN Dilaudid, PRN anti-emetics  - GI consult     Gallbladder Polyps  - noted on RUQ U/S  - assessed RF for GB carcinoma - female, older age and findings of gallbladder polyps; denies tobacco abuse, hx of gallstones or bile duct disease, family hx of GB cancer, or exposure to chemicals in textile and rubber industry  - recommended f/u U/S OP in 6 months      Sinus Bradycardia  - HR noted to be in low mid 50s, EKG with HR in upper 40s  - currently on BB  - HR has since improved, will add holding parameters and monitor on tele     Recent Cataract Surgery  - on 5/12  - elevate head of bed  - will continue eye drops once reconciled by RN     HTN  - uncontrolled - likely worsened with pain, also pt had not taken her meds this morning  - cont Cozaar, Propranolol  - will monitor with pain control as above     HLD  - hold for now     DVT Prophylaxis: Lovenox  Diet: Diet NPO Effective Now  Code Status: full      F/U HIDA scan results  If normal -> consider advancing diet.   If abnormal she will need surgery eval for possible cholecystectomy    Marleny Silveira MD 5/14/2021 10:51 AM

## 2021-05-14 NOTE — PROGRESS NOTES
4 Eyes Skin Assessment     The patient is being assess for   Admission    I agree that 2 RN's have performed a thorough Head to Toe Skin Assessment on the patient. ALL assessment sites listed below have been assessed. Areas assessed by both nurses:   [x]   Head, Face, and Ears   [x]   Shoulders, Back, and Chest, Abdomen  [x]   Arms, Elbows, and Hands   [x]   Coccyx, Sacrum, and Ischium  [x]   Legs, Feet, and Heels        No issues noted upon assessment    Co-signer eSignature: Electronically signed by Ny Woodson RN on 5/14/21 at 6:00 AM EDT    Does the Patient have Skin Breakdown?   No          Reed Prevention initiated:  No   Wound Care Orders initiated:  No      WOC nurse consulted for Pressure Injury (Stage 3,4, Unstageable, DTI, NWPT, Complex wounds)and New or Established Ostomies:  No      Primary Nurse eSignature: Electronically signed by Sunday Art RN on 5/14/21 at 5:59 AM EDT

## 2021-05-14 NOTE — FLOWSHEET NOTE
05/14/21 0746   Vital Signs   Temp 100.1 °F (37.8 °C)   Temp Source Oral   Pulse 67   Heart Rate Source Monitor   Resp 18   BP (!) 154/89   BP Location Left Arm   Patient Position Semi fowlers   Level of Consciousness Alert (0)   MEWS Score 1   Oxygen Therapy   SpO2 96 %   O2 Device None (Room air)     Vitals as above this am. Assessment completed, see flowsheets. Patient in stable condition this am, blood pressure medication and protonix given. Patient educated on plan of care for the day including HIDA scan that will occur this afternoon. Patient verbalized understanding. Patient provided with non skid socks and was assisted to and from the restroom. Call light within reach.

## 2021-05-14 NOTE — ACP (ADVANCE CARE PLANNING)
Advance Care Planning   Healthcare Decision Maker:    Primary Decision Maker: Nu Mcgowan Spouse - [phone]    Click here to complete Healthcare Decision Makers including selection of the Healthcare Decision Maker Relationship (ie \"Primary\").

## 2021-05-14 NOTE — PROGRESS NOTES
Pt medicated with Phenergan and dilaudid per admitting orders.   Report given to [facility] on [facility].

## 2021-05-14 NOTE — CONSULTS
Gastroenterology Consult Note    Patient:   Batool Armenta   :    1950   Facility:   [facility]  Referring/PCP: Kayli Peerz APRN - CNP  Date:     2021  Consultant:   Annita Rodriguez PA-C      Chief Complaint   Patient presents with    Abdominal Pain     epigastric pain started yesterday after caratact removal; vomited this morning; pain relieved after emesis        History of Present illness   69year old female with a history of endometrial cancer s/p complete hysterectomy and chemotherapy in remission, HLD, HTN, and migraine headaches presented to the ED with abdominal pain. She felt \"off\" after eye surgery two days ago. She woke up yesterday morning with upper abdominal pain worse in the RUQ. She vomited up the soup she had for dinner the night before while in the waiting room at the ED. She admits to lingering epigastric abdominal pain and nausea. She denies dysphagia, heartburn, cramping, bloating, change in bowel habits, rectal bleeding, melena, weight loss, or early satiety. She drinks four glasses of wine weekly. She denies Tylenol, ibuprofen, tobacco, marijuana, and illicit substance use, or recent medication changes. She has a personal history of colon polyps, and family history of colon cancer in her mother. Her last colonoscopy in  showed benign precancerous polyp. She has never had an EGD.     Past Medical History:   Diagnosis Date    Cancer [facility])     endometrial, last chemo 5 yrs ago    Clostridium difficile infection 14    Hx of blood clots     Hyperlipidemia     Hypertension     Migraines      Past Surgical History:   Procedure Laterality Date    HYSTERECTOMY      OVARY REMOVAL N/A     TUNNELED VENOUS PORT PLACEMENT Right removal    PORT A CATH REMOVAL RIGHT CHEST              Social:   Social History     Tobacco Use    Smoking status: Never Smoker    Smokeless tobacco: Never Used   Substance Use Topics    Alcohol use: Yes     Comment: occasionally     Family:   Family History   Problem Relation Age of Onset    Cancer Mother         colon    Hypertension Mother     High Cholesterol Mother     High Cholesterol Father      No current facility-administered medications on file prior to encounter.       Current Outpatient Medications on File Prior to Encounter   Medication Sig Dispense Refill    diclofenac (VOLTAREN) 0.1 % ophthalmic solution Place 2 drops into the left eye 4 times daily      prednisoLONE acetate (PRED FORTE) 1 % ophthalmic suspension Place 2 drops into the left eye 4 times daily      moxifloxacin (VIGAMOX) 0.5 % ophthalmic solution Place 2 drops into the left eye 4 times daily       propranolol (INDERAL) 10 MG tablet Take 10 mg by mouth 3 times daily      atorvastatin (LIPITOR) 20 MG tablet Take 20 mg by mouth daily      FLUoxetine (PROZAC) 20 MG capsule Take 10 mg by mouth daily       losartan (COZAAR) 25 MG tablet Take 50 mg by mouth daily       acetaminophen (TYLENOL) 325 MG tablet Take 650 mg by mouth every 6 hours as needed for Pain      ibuprofen (ADVIL;MOTRIN) 200 MG tablet Take 200 mg by mouth every 6 hours as needed for Pain        Infusions:    sodium chloride 75 mL/hr at 05/13/21 2206    sodium chloride       PRN Medications: sodium chloride flush, sodium chloride, potassium chloride **OR** potassium alternative oral replacement **OR** potassium chloride, magnesium sulfate, promethazine **OR** ondansetron, polyethylene glycol, acetaminophen **OR** acetaminophen, HYDROmorphone, hydrALAZINE  Allergies: No Known Allergies    ROS:   Constitutional: negative for chills, fevers and sweats  Eyes: negative for cataracts, icterus and redness  Ears, nose, mouth, throat, and face: negative for epistaxis, hearing loss and sore throat  Respiratory: negative for cough, hemoptysis and sputum  Cardiovascular: negative for chest pain, dyspnea and lower extremity edema  Gastrointestinal: as per HPI  Genitourinary:negative for dysuria, frequency and hematuria  Neurological: negative for coordination problems, dizziness and gait problems  Behavioral/Psych: negative for anxiety, depression and mood swings    Physical Exam   BP (!) 172/98   Pulse 84   Temp 99.2 °F (37.3 °C) (Oral)   Resp 16   Ht 5' 3\" (1.6 m)   Wt 146 lb 3.2 oz (66.3 kg)   SpO2 98%   BMI 25.90 kg/m²       General appearance: alert, appears stated age, cooperative and no distress  Head: Normocephalic, without obvious abnormality, atraumatic  Eyes: conjunctivae/corneas clear. PERRL, EOM's intact. Fundi benign. Neck: no adenopathy and supple, symmetrical, trachea midline  Lungs: clear to auscultation bilaterally  Heart: regular rate and rhythm, S1, S2 normal, no murmur, click, rub or gallop  Abdomen: normal findings: bowel sounds normal, no masses palpable and symmetric and abnormal findings:  tenderness mild in the epigastrium  Extremities: extremities normal, atraumatic, no cyanosis or edema    Lab and Imaging Review   Labs:  CBC:   Recent Labs     05/13/21  1006 05/14/21  0520   WBC 5.1 7.2   HGB 12.7 12.4   HCT 37.2 36.8   MCV 92.2 93.7    211     BMP:   Recent Labs     05/13/21  1006   *   K 4.0      CO2 27   BUN 12   CREATININE 0.7     LIVER PROFILE:   Recent Labs     05/13/21  1006   AST 22   ALT 16   LIPASE 23.0   PROT 6.5   BILITOT 0.3   ALKPHOS 44         IMAGING:  XR CHEST PORTABLE  Impression   No acute cardiopulmonary disease. CT ABDOMEN PELVIS W IV CONTRAST   Impression   Nonobstructing left nephrolithiasis. US GALLBLADDER RUQ  Impression   1. Sonographic Vu side with borderline distended gallbladder.  No   obstructing gallstones identified.  Findings could be seen in acalculous   cholecystitis.  Consider a HIDA scan for further confirmation. 2. The patient has gallbladder polyps, measuring less than 6 mm.  If the   patient has risk factors for gallbladder carcinoma, follow-up ultrasound in 6   months is recommended.      NM HEPATOBILIARY SCAN W PHARMACOLOGICAL INTERVENTION  Status: In process      Attending Supervising [de-identified] Attestation Statement  The patient is a 69 y.o. female. I have performed a history and physical examination of the patient. I discussed the case with my physician assistant Krishna Turpin PA-C    I reviewed the patient's Past Medical History, Past Surgical History, Medications, and Allergies. Physical Exam:  Vitals:    05/14/21 0506 05/14/21 0746 05/14/21 1359 05/14/21 1736   BP: (!) 165/95 (!) 154/89 (!) 172/98 (!) 186/92   Pulse: 70 67 84 69   Resp: 14 18 16 18   Temp: 98.1 °F (36.7 °C) 100.1 °F (37.8 °C) 99.2 °F (37.3 °C) 99.2 °F (37.3 °C)   TempSrc: Oral Oral Oral Oral   SpO2: 96% 96% 98% 95%   Weight:       Height:             Assessment:   69year old female with a history of endometrial cancer s/p complete hysterectomy and chemotherapy in remission, HLD, HTN, and migraine headaches admitted with nausea, vomiting, and abdominal pain concerning for acute cholecystitis but cannot exclude PUD vs reactive gastritis. Plan:   1. Continue supportive care  2. Monitor and document output  3. Monitor LFTs  4. NPO, IVF, pain control  5. Await HIDA scan results  6. Ok to start low fat diet from GI standpoint if HIDA scan normal  7. Will follow  8. Will consider outpatient EGD if HIDA scan negative    [place] PA-C  3:13 PM 5/14/2021                      69year old female with a history of HTN, HLD, headaches, endometrial cancer s/p complete hysterectomy and chemotherapy in remission admitted with nausea, vomiting, and abdominal pain, likely acute cholecystitis but cannot exclude PUD vs reactive gastritis. Continue supportive care. PPI BID. Await HIDA scan.  Can consider EGD based on HIDA scan results    Rebekah Alvarado MD          (O) [phone]  Mikala Palomo

## 2021-05-15 PROCEDURE — 6370000000 HC RX 637 (ALT 250 FOR IP): Performed by: INTERNAL MEDICINE

## 2021-05-15 PROCEDURE — 1200000000 HC SEMI PRIVATE

## 2021-05-15 PROCEDURE — C9113 INJ PANTOPRAZOLE SODIUM, VIA: HCPCS | Performed by: INTERNAL MEDICINE

## 2021-05-15 PROCEDURE — 6360000002 HC RX W HCPCS: Performed by: INTERNAL MEDICINE

## 2021-05-15 PROCEDURE — 2580000003 HC RX 258: Performed by: INTERNAL MEDICINE

## 2021-05-15 PROCEDURE — 99232 SBSQ HOSP IP/OBS MODERATE 35: CPT | Performed by: INTERNAL MEDICINE

## 2021-05-15 PROCEDURE — 6370000000 HC RX 637 (ALT 250 FOR IP): Performed by: PHYSICIAN ASSISTANT

## 2021-05-15 RX ADMIN — PROPRANOLOL HYDROCHLORIDE 10 MG: 10 TABLET ORAL at 14:29

## 2021-05-15 RX ADMIN — ACETAMINOPHEN 650 MG: 325 TABLET ORAL at 00:07

## 2021-05-15 RX ADMIN — AMPICILLIN AND SULBACTAM 1500 MG: 1; .5 INJECTION, POWDER, FOR SOLUTION INTRAMUSCULAR; INTRAVENOUS at 14:29

## 2021-05-15 RX ADMIN — ENOXAPARIN SODIUM 40 MG: 40 INJECTION SUBCUTANEOUS at 09:36

## 2021-05-15 RX ADMIN — PROPRANOLOL HYDROCHLORIDE 10 MG: 10 TABLET ORAL at 09:37

## 2021-05-15 RX ADMIN — SODIUM CHLORIDE: 9 INJECTION, SOLUTION INTRAVENOUS at 20:31

## 2021-05-15 RX ADMIN — MOXIFLOXACIN 2 DROP: 5 SOLUTION/ DROPS OPHTHALMIC at 08:08

## 2021-05-15 RX ADMIN — AMPICILLIN AND SULBACTAM 1500 MG: 1; .5 INJECTION, POWDER, FOR SOLUTION INTRAMUSCULAR; INTRAVENOUS at 20:31

## 2021-05-15 RX ADMIN — MOXIFLOXACIN 2 DROP: 5 SOLUTION/ DROPS OPHTHALMIC at 20:35

## 2021-05-15 RX ADMIN — LOSARTAN POTASSIUM 50 MG: 25 TABLET, FILM COATED ORAL at 09:37

## 2021-05-15 RX ADMIN — PANTOPRAZOLE SODIUM 40 MG: 40 INJECTION, POWDER, FOR SOLUTION INTRAVENOUS at 09:37

## 2021-05-15 RX ADMIN — Medication 10 ML: at 09:37

## 2021-05-15 RX ADMIN — PREDNISOLONE ACETATE 2 DROP: 10 SUSPENSION/ DROPS OPHTHALMIC at 16:41

## 2021-05-15 RX ADMIN — PREDNISOLONE ACETATE 2 DROP: 10 SUSPENSION/ DROPS OPHTHALMIC at 08:08

## 2021-05-15 RX ADMIN — DICLOFENAC SODIUM 2 DROP: 1 SOLUTION/ DROPS OPHTHALMIC at 16:40

## 2021-05-15 RX ADMIN — DICLOFENAC SODIUM 2 DROP: 1 SOLUTION/ DROPS OPHTHALMIC at 08:08

## 2021-05-15 RX ADMIN — DICLOFENAC SODIUM 2 DROP: 1 SOLUTION/ DROPS OPHTHALMIC at 14:25

## 2021-05-15 RX ADMIN — PREDNISOLONE ACETATE 2 DROP: 10 SUSPENSION/ DROPS OPHTHALMIC at 14:26

## 2021-05-15 RX ADMIN — MOXIFLOXACIN 2 DROP: 5 SOLUTION/ DROPS OPHTHALMIC at 14:25

## 2021-05-15 RX ADMIN — MOXIFLOXACIN 2 DROP: 5 SOLUTION/ DROPS OPHTHALMIC at 16:40

## 2021-05-15 RX ADMIN — SODIUM CHLORIDE: 9 INJECTION, SOLUTION INTRAVENOUS at 07:25

## 2021-05-15 RX ADMIN — PROPRANOLOL HYDROCHLORIDE 10 MG: 10 TABLET ORAL at 20:30

## 2021-05-15 RX ADMIN — PREDNISOLONE ACETATE 2 DROP: 10 SUSPENSION/ DROPS OPHTHALMIC at 20:35

## 2021-05-15 RX ADMIN — DICLOFENAC SODIUM 2 DROP: 1 SOLUTION/ DROPS OPHTHALMIC at 20:35

## 2021-05-15 RX ADMIN — PANTOPRAZOLE SODIUM 40 MG: 40 INJECTION, POWDER, FOR SOLUTION INTRAVENOUS at 20:31

## 2021-05-15 RX ADMIN — DOCUSATE SODIUM 100 MG: 100 CAPSULE, LIQUID FILLED ORAL at 09:37

## 2021-05-15 ASSESSMENT — PAIN DESCRIPTION - LOCATION: LOCATION: HEAD

## 2021-05-15 ASSESSMENT — PAIN DESCRIPTION - ONSET: ONSET: ON-GOING

## 2021-05-15 ASSESSMENT — PAIN DESCRIPTION - PROGRESSION: CLINICAL_PROGRESSION: NOT CHANGED

## 2021-05-15 ASSESSMENT — PAIN SCALES - GENERAL: PAINLEVEL_OUTOF10: 0

## 2021-05-15 ASSESSMENT — PAIN DESCRIPTION - PAIN TYPE: TYPE: ACUTE PAIN

## 2021-05-15 NOTE — PROGRESS NOTES
Pt tolerated full liquid diet for lunch without pain, nausea or vomiting. No other needs at present time. Call light in reach.

## 2021-05-15 NOTE — PROGRESS NOTES
Consult has been called to Dr. Neal Menjivar on 5/15/21. Spoke with srikanth.  9:26 AM    [employer]  5/15/2021

## 2021-05-15 NOTE — PLAN OF CARE
Problem: Pain:  Goal: Pain level will decrease  Description: Pain level will decrease  5/15/2021 0051 by Jaison Hollingsworth RN  Outcome: Ongoing  5/14/2021 1613 by Lela Alvarado RN  Outcome: Ongoing  Goal: Control of acute pain  Description: Control of acute pain  5/14/2021 1613 by Lela Alvarado RN  Outcome: Ongoing  Goal: Control of chronic pain  Description: Control of chronic pain  5/14/2021 1613 by Lela Alvarado RN  Outcome: Ongoing     Problem: PAIN  Goal: Patient's pain/discomfort is manageable  Outcome: Ongoing     Problem: SKIN INTEGRITY  Goal: Skin integrity is maintained or improved  Outcome: Ongoing

## 2021-05-15 NOTE — PROGRESS NOTES
Progress Note    Admit Date:  5/13/2021    Admitted with abdominal pain. Right upper quadrant ultrasound showed distended gallbladder, no stones  HIDA scan done on 5/14/2021. Is abnormal suggestive of cholecystitis    Subjective:  Ms. Pola Guerin is doing well. She has  minimal right upper quadrant tenderness . she has low-grade fevers. no nausea or vomiting.   tolerating liquid diet. general surgery has been consulted     Objective:   /78   Pulse 68   Temp 99.3 °F (37.4 °C) (Oral)   Resp 14   Ht 5' 3\" (1.6 m)   Wt 146 lb 3.2 oz (66.3 kg)   SpO2 97%   BMI 25.90 kg/m²   No intake or output data in the 24 hours ending 05/15/21 1414      Physical Exam:  General:  Awake, alert, NAD  Skin:  Warm and dry  Neck:  JVD absent. Neck supple  Chest:  Clear to auscultation, respiration easy. No wheezes, rales or rhonchi. Cardiovascular:  RRR ,S1S2 normal  Abdomen:  Soft, non distended, BS +  Right upper quadrant tender on exam  Extremities:  No edema. Intact peripheral pulses.  Brisk cap refill, < 2 secs  Neuro: non focal      Medications:   Scheduled Meds:   ampicillin-sulbactam  1,500 mg Intravenous Q6H    prednisoLONE acetate  2 drop Left Eye 4x Daily    diclofenac  2 drop Left Eye 4x Daily    moxifloxacin  2 drop Left Eye 4x Daily    propranolol  10 mg Oral TID    losartan  50 mg Oral Daily    sodium chloride flush  5-40 mL Intravenous 2 times per day    enoxaparin  40 mg Subcutaneous Daily    pantoprazole  40 mg Intravenous BID    docusate sodium  100 mg Oral Daily    polyethylene glycol  17 g Oral Daily       Continuous Infusions:   sodium chloride 75 mL/hr at 05/15/21 0725    sodium chloride         Data:  CBC:   Recent Labs     05/13/21  1006 05/14/21  0520   WBC 5.1 7.2   RBC 4.03 3.93*   HGB 12.7 12.4   HCT 37.2 36.8   MCV 92.2 93.7   RDW 13.4 13.7    211     BMP:   Recent Labs     05/13/21  1006   *   K 4.0      CO2 27   BUN 12   CREATININE 0.7     BNP: No results for input(s): BNP in the last 72 hours. PT/INR: No results for input(s): PROTIME, INR in the last 72 hours. APTT: No results for input(s): APTT in the last 72 hours. CARDIAC ENZYMES:   Recent Labs     05/13/21  1006   TROPONINI <0.01     FASTING LIPID PANEL:No results found for: CHOL, HDL, TRIG  LIVER PROFILE:   Recent Labs     05/13/21  1006   AST 22   ALT 16   BILITOT 0.3   ALKPHOS 44         CULTURES     COVID 19 RAPID PCR: pending     Rapid Influenza A/B: pending       EKG:  I have reviewed the EKG with the following interpretation:   Marked sinus bradycardia rate of 48  Abnormal ECG  Confirmed by MADHURI GARCIA MD ([id number]) on 5/13/2021 2:01:40 PM      RADIOLOGY    NM HEPATOBILIARY   Final Result   1. Nonvisualization of the gallbladder after 120 minutes. Given prior   ultrasound finding, acalculous cholecystitis may be considered clinically. Other differential considerations for a nonvisualized gallbladder would   include chronic cholecystitis, prolonged fasting or recent meal, alcohol use   and pancreatitis. 2. No evidence of biliary obstruction. US GALLBLADDER RUQ   Final Result   1. Sonographic Vu side with borderline distended gallbladder. No   obstructing gallstones identified. Findings could be seen in acalculous   cholecystitis. Consider a HIDA scan for further confirmation. 2. The patient has gallbladder polyps, measuring less than 6 mm. If the   patient has risk factors for gallbladder carcinoma, follow-up ultrasound in 6   months is recommended. CT ABDOMEN PELVIS W IV CONTRAST Additional Contrast? None   Final Result   Nonobstructing left nephrolithiasis. XR CHEST PORTABLE   Final Result   No acute cardiopulmonary disease. Assessment:  Active Problems:    Abdominal pain    Abnormal gallbladder ultrasound  Resolved Problems:    * No resolved hospital problems.  *      Plan:    Nausea and Vomiting  Intractable Abdominal Pain  Acute acalculous cholecystitis  - no leukocytosis, LFTs normal, pt is afebrile  - CT abdomen: non-acute; RUQ U/S: sonographic Vu sign with borderline distended gallbladder, no obstructing stone identified  - Admit to Med Surg  - pct normal   - checked HIDA scan - abnormal .  Suggestive of cholecystitis. -Start IV antibiotics-IV Unasyn.  -General surgery consulted  - IVF, PRN Dilaudid, PRN anti-emetics    Gallbladder Polyps  - noted on RUQ U/S  - assessed RF for GB carcinoma - female, older age and findings of gallbladder polyps; denies tobacco abuse, hx of gallstones or bile duct disease, family hx of GB cancer, or exposure to chemicals in textile and rubber industry  - recommended f/u U/S OP in 6 months .   Await general surgery consultation, will likely get cholecystectomy      Sinus Bradycardia  - HR noted to be in low mid 50s initially on admit, EKG with HR in upper 40s  - currently on BB  - HR has since improved, will add holding parameters and monitor on tele     Recent Cataract Surgery  - on 5/12  - elevate head of bed  - will continue eye drops once reconciled by RN     HTN  - uncontrolled - likely worsened with pain, also pt had not taken her meds this morning  - cont Cozaar, Propranolol  - will monitor with pain control as above  Blood pressure better     HLD  - hold for now     DVT Prophylaxis: Lovenox   DIET FULL LIQUID;  Code Status: full        Matt Handley MD   5/15/2021 2:14 PM

## 2021-05-15 NOTE — PROGRESS NOTES
PROGRESS NOTE  S:70 yrs Patient  admitted on 5/13/2021 with Abdominal pain [R10.9]   Nonvisualized gallbladder on HIDA scan    Current Hospital Schedued Meds   prednisoLONE acetate  2 drop Left Eye 4x Daily    diclofenac  2 drop Left Eye 4x Daily    moxifloxacin  2 drop Left Eye 4x Daily    propranolol  10 mg Oral TID    losartan  50 mg Oral Daily    sodium chloride flush  5-40 mL Intravenous 2 times per day    enoxaparin  40 mg Subcutaneous Daily    pantoprazole  40 mg Intravenous BID    docusate sodium  100 mg Oral Daily    polyethylene glycol  17 g Oral Daily     Current Hospital IV Meds   sodium chloride 75 mL/hr at 05/15/21 0725    sodium chloride       Current Hospital PRN Meds  sodium chloride flush, sodium chloride, potassium chloride **OR** potassium alternative oral replacement **OR** potassium chloride, magnesium sulfate, promethazine **OR** ondansetron, polyethylene glycol, acetaminophen **OR** acetaminophen, HYDROmorphone, hydrALAZINE    Exam:   Vitals:    05/15/21 0801   BP: 139/78   Pulse: 68   Resp: 14   Temp: 99.3 °F (37.4 °C)   SpO2: 97%     No intake/output data recorded.    General appearance: alert, appears stated age and cooperative  HEENT: PERRLA  Neck: no adenopathy, no carotid bruit, no JVD, supple, symmetrical, trachea midline and thyroid not enlarged, symmetric, no tenderness/mass/nodules  Lungs: clear to auscultation bilaterally  Heart: regular rate and rhythm, S1, S2 normal, no murmur, click, rub or gallop  Abdomen: soft, non-tender; bowel sounds normal; no masses,  no organomegaly  Extremities: extremities normal, atraumatic, no cyanosis or edema     Labs:  CBC:   Recent Labs     05/13/21  1006 05/14/21  0520   WBC 5.1 7.2   HGB 12.7 12.4   HCT 37.2 36.8   MCV 92.2 93.7    211     BMP:   Recent Labs     05/13/21  1006   *   K 4.0      CO2 27   BUN 12   CREATININE 0.7     LIVER PROFILE:   Recent Labs     05/13/21  1006 AST 22   ALT 16   LIPASE 23.0   PROT 6.5   BILITOT 0.3   ALKPHOS 44     PT/INR: No results for input(s): INR in the last 72 hours. Invalid input(s): PT    IMAGING:  NM HEPATOBILIARY    Result Date: 5/15/2021  EXAMINATION: NUCLEAR MEDICINE HEPATOBILIARY SCINTIGRAPHY (HIDA SCAN). 5/14/2021 3:17 pm TECHNIQUE: Approximately 5.4 [id number] Mebrofenin (Choletec) was administered IV. Then, dynamic images of the abdomen were obtained in the anterior projection for 120 mins. COMPARISON: 05/13/2021 CT and ultrasound. HISTORY: ORDERING SYSTEM PROVIDED HISTORY: abd pain TECHNOLOGIST PROVIDED HISTORY: Reason for exam:->abd pain FINDINGS: Prompt, homogenous uptake by the liver is noted with normal appearance of radiotracer excretion into the biliary system. Clearance of bloodpool activity appears appropriate. Excretion into the duodenum and small bowel is appropriate. No evidence of biliary obstruction. The gallbladder is not identified after 60 minutes. Additional imaging for a full 120 minutes was performed with no visualization of the gallbladder. 1. Nonvisualization of the gallbladder after 120 minutes. Given prior ultrasound finding, acalculous cholecystitis may be considered clinically. Other differential considerations for a nonvisualized gallbladder would include chronic cholecystitis, prolonged fasting or recent meal, alcohol use and pancreatitis. 2. No evidence of biliary obstruction. CT ABDOMEN PELVIS W IV CONTRAST Additional Contrast? None    Result Date: 5/13/2021  EXAMINATION: CT OF THE ABDOMEN AND PELVIS WITH CONTRAST 5/13/2021 11:25 am TECHNIQUE: CT of the abdomen and pelvis was performed with the administration of intravenous contrast. Multiplanar reformatted images are provided for review. Dose modulation, iterative reconstruction, and/or weight based adjustment of the mA/kV was utilized to reduce the radiation dose to as low as reasonably achievable.  COMPARISON: 10/15/2019 HISTORY: ORDERING SYSTEM PROVIDED HISTORY: abd pain TECHNOLOGIST PROVIDED HISTORY: Reason for exam:->abd pain Additional Contrast?->None Decision Support Exception - unselect if not a suspected or confirmed emergency medical condition->Emergency Medical Condition (MA) Reason for Exam: abdominal pain , epigastric with NV Acuity: Acute Type of Exam: Initial FINDINGS: Lower Chest: There is no consolidation or effusion. Organs: The liver, pancreas, spleen, kidneys and adrenals are unremarkable aside from a nonobstructing 3 mm left intrarenal calculus. GI/Bowel: There is no bowel dilatation, wall thickening or obstruction. The appendix is normal. Pelvis: The uterus is surgically absent. The bladder is unremarkable. Peritoneum/Retroperitoneum: There is no free air, free fluid or intraperitoneal inflammatory change. There is no adenopathy. Bones/Soft Tissues: There is no acute fracture or aggressive osseous lesion. Nonobstructing left nephrolithiasis. US GALLBLADDER RUQ    Result Date: 5/13/2021  EXAMINATION: RIGHT UPPER QUADRANT ULTRASOUND 5/13/2021 1:20 pm COMPARISON: CT abdomen pelvis 05/13/2021 HISTORY: ORDERING SYSTEM PROVIDED HISTORY: RUQ pain TECHNOLOGIST PROVIDED HISTORY: Reason for exam:->RUQ pain FINDINGS: LIVER:  Liver measures 14 cm. No concerning hepatic findings. BILIARY SYSTEM:  Sonographic Vu sign noted. Questionable sludge seen in the gallbladder. No radiodense stones. Polyps are present in the gallbladder, measuring up to 3 mm. The gallbladder is borderline enlarged. No radiodense obstructing stones identified. Common bile duct is within normal limits measuring 5 mm. RIGHT KIDNEY: The right kidney is grossly unremarkable without evidence of hydronephrosis. PANCREAS:  Visualized portions of the pancreas are unremarkable. OTHER: No evidence of right upper quadrant ascites. 1. Sonographic Vu side with borderline distended gallbladder. No obstructing gallstones identified.   Findings could be

## 2021-05-15 NOTE — PROGRESS NOTES
Pt called to talk to Charge nurse regarding care from MD.  Talked to pt and pt very upset about not knowing what is going on while she is here. She states that she is not being told anything. Explained that Dr. Sullivan Files ordered for consult for general surgery and full liquid diet. Explained to pt that this writer will call general surgery and see if she is going to be going surgery or if she can eat food. Pt very upset because of history in family dieing with their gallbladder issues. Reassured pt that this writer was calling MD. Page placed to Dr. Krish Galvin. Waiting for call back.

## 2021-05-15 NOTE — FLOWSHEET NOTE
05/15/21 0434   Vitals   Temp 98.4 °F (36.9 °C)   Temp Source Oral   Pulse 70   Heart Rate Source Monitor   Resp 18   /83   BP Location Right upper arm   BP Upper/Lower Upper   BP Method Automatic   Patient Position Semi fowlers   Level of Consciousness Alert (0)   MEWS Score 1   Oxygen Therapy   SpO2 94 %   O2 Device None (Room air)   Patient resting in bed with eyes closed. No changes noted. Will continue to monitor,call light within reach.

## 2021-05-15 NOTE — FLOWSHEET NOTE
05/14/21 1957   Vitals   Temp 98.2 °F (36.8 °C)   Temp Source Oral   Pulse 67   Heart Rate Source Monitor   Resp 18   BP (!) 145/78   BP Location Right upper arm   BP Upper/Lower Upper   BP Method Automatic   Patient Position Semi fowlers   Level of Consciousness Alert (0)   MEWS Score 1   Oxygen Therapy   SpO2 93 %   O2 Device None (Room air)   Shift assessment completed-see flow sheet. Patient in bed awake,alert and oriented x4. Vitals stable. Evening medications given per order. Patient denies any abdomen pain or nausea at this time. Will continue to monitor,call light within reach.

## 2021-05-16 VITALS
SYSTOLIC BLOOD PRESSURE: 150 MMHG | RESPIRATION RATE: 14 BRPM | HEART RATE: 67 BPM | WEIGHT: 149.1 LBS | BODY MASS INDEX: 26.42 KG/M2 | DIASTOLIC BLOOD PRESSURE: 80 MMHG | OXYGEN SATURATION: 96 % | HEIGHT: 63 IN | TEMPERATURE: 98.6 F

## 2021-05-16 PROCEDURE — 6370000000 HC RX 637 (ALT 250 FOR IP): Performed by: INTERNAL MEDICINE

## 2021-05-16 PROCEDURE — 2580000003 HC RX 258: Performed by: INTERNAL MEDICINE

## 2021-05-16 PROCEDURE — 6370000000 HC RX 637 (ALT 250 FOR IP): Performed by: PHYSICIAN ASSISTANT

## 2021-05-16 PROCEDURE — 99239 HOSP IP/OBS DSCHRG MGMT >30: CPT | Performed by: INTERNAL MEDICINE

## 2021-05-16 PROCEDURE — 99222 1ST HOSP IP/OBS MODERATE 55: CPT | Performed by: SURGERY

## 2021-05-16 PROCEDURE — C9113 INJ PANTOPRAZOLE SODIUM, VIA: HCPCS | Performed by: INTERNAL MEDICINE

## 2021-05-16 PROCEDURE — 6360000002 HC RX W HCPCS: Performed by: INTERNAL MEDICINE

## 2021-05-16 RX ORDER — PANTOPRAZOLE SODIUM 40 MG/1
40 TABLET, DELAYED RELEASE ORAL DAILY
Qty: 30 TABLET | Refills: 1 | Status: SHIPPED | OUTPATIENT
Start: 2021-05-16

## 2021-05-16 RX ORDER — AMOXICILLIN AND CLAVULANATE POTASSIUM 875; 125 MG/1; MG/1
1 TABLET, FILM COATED ORAL 2 TIMES DAILY
Qty: 14 TABLET | Refills: 0 | Status: SHIPPED | OUTPATIENT
Start: 2021-05-16 | End: 2021-05-23

## 2021-05-16 RX ORDER — LACTOBACILLUS RHAMNOSUS GG 10B CELL
1 CAPSULE ORAL 2 TIMES DAILY
Qty: 30 CAPSULE | Refills: 0 | Status: SHIPPED | OUTPATIENT
Start: 2021-05-16 | End: 2021-05-27

## 2021-05-16 RX ADMIN — Medication 10 ML: at 08:19

## 2021-05-16 RX ADMIN — ENOXAPARIN SODIUM 40 MG: 40 INJECTION SUBCUTANEOUS at 08:18

## 2021-05-16 RX ADMIN — PANTOPRAZOLE SODIUM 40 MG: 40 INJECTION, POWDER, FOR SOLUTION INTRAVENOUS at 08:18

## 2021-05-16 RX ADMIN — MOXIFLOXACIN 2 DROP: 5 SOLUTION/ DROPS OPHTHALMIC at 08:26

## 2021-05-16 RX ADMIN — LOSARTAN POTASSIUM 50 MG: 25 TABLET, FILM COATED ORAL at 08:17

## 2021-05-16 RX ADMIN — DOCUSATE SODIUM 100 MG: 100 CAPSULE, LIQUID FILLED ORAL at 08:18

## 2021-05-16 RX ADMIN — POLYETHYLENE GLYCOL (3350) 17 G: 17 POWDER, FOR SOLUTION ORAL at 08:18

## 2021-05-16 RX ADMIN — DICLOFENAC SODIUM 2 DROP: 1 SOLUTION/ DROPS OPHTHALMIC at 08:26

## 2021-05-16 RX ADMIN — ACETAMINOPHEN 650 MG: 325 TABLET ORAL at 08:18

## 2021-05-16 RX ADMIN — PROPRANOLOL HYDROCHLORIDE 10 MG: 10 TABLET ORAL at 08:17

## 2021-05-16 RX ADMIN — AMPICILLIN AND SULBACTAM 1500 MG: 1; .5 INJECTION, POWDER, FOR SOLUTION INTRAMUSCULAR; INTRAVENOUS at 08:18

## 2021-05-16 RX ADMIN — PREDNISOLONE ACETATE 2 DROP: 10 SUSPENSION/ DROPS OPHTHALMIC at 08:26

## 2021-05-16 RX ADMIN — AMPICILLIN AND SULBACTAM 1500 MG: 1; .5 INJECTION, POWDER, FOR SOLUTION INTRAMUSCULAR; INTRAVENOUS at 01:35

## 2021-05-16 ASSESSMENT — PAIN SCALES - GENERAL: PAINLEVEL_OUTOF10: 1

## 2021-05-16 NOTE — CONSULTS
Department of General Surgery Consult    PATIENT NAME: Veronica Jang   YOB: 1950    ADMISSION DATE: 5/13/2021  9:41 AM      TODAY'S DATE: 5/16/2021    Reason for Consult:  Cholecystitis    Chief Complaint: abd pain    Requesting Physician:  Ema Snow    HISTORY OF PRESENT ILLNESS:              The patient is a 69 y.o. female who presents with upper abdominal pain. Was in band like pattern and started earlier in the week. Some bloating. Some nausea but no emesis. No fevers or chills. Improved today. .    Past Medical History:        Diagnosis Date    Cancer [facility])     endometrial, last chemo 5 yrs ago    Clostridium difficile infection 4/2/14    Hx of blood clots     Hyperlipidemia     Hypertension     Migraines        Past Surgical History:        Procedure Laterality Date    HYSTERECTOMY      OVARY REMOVAL N/A 2008    TUNNELED VENOUS PORT PLACEMENT Right removal    PORT A CATH REMOVAL RIGHT CHEST              Current Medications:   Current Facility-Administered Medications: ampicillin-sulbactam (UNASYN) 1500 mg IVPB minibag, 1,500 mg, Intravenous, Q6H  prednisoLONE acetate (PRED FORTE) 1 % ophthalmic suspension 2 drop, 2 drop, Left Eye, 4x Daily  diclofenac (VOLTAREN) 0.1 % ophthalmic solution 2 drop, 2 drop, Left Eye, 4x Daily  moxifloxacin (VIGAMOX) 0.5 % ophthalmic solution 2 drop, 2 drop, Left Eye, 4x Daily  propranolol (INDERAL) tablet 10 mg, 10 mg, Oral, TID  losartan (COZAAR) tablet 50 mg, 50 mg, Oral, Daily  0.9 % sodium chloride infusion, , Intravenous, Continuous  sodium chloride flush 0.9 % injection 5-40 mL, 5-40 mL, Intravenous, 2 times per day  sodium chloride flush 0.9 % injection 5-40 mL, 5-40 mL, Intravenous, PRN  0.9 % sodium chloride infusion, 25 mL, Intravenous, PRN  potassium chloride (KLOR-CON M) extended release tablet 40 mEq, 40 mEq, Oral, PRN **OR** potassium bicarb-citric acid (EFFER-K) effervescent tablet 40 mEq, 40 mEq, Oral, PRN **OR** potassium chloride 10 mEq/100 mL IVPB (Peripheral Line), 10 mEq, Intravenous, PRN  magnesium sulfate 1000 mg in dextrose 5% 100 mL IVPB, 1,000 mg, Intravenous, PRN  enoxaparin (LOVENOX) injection 40 mg, 40 mg, Subcutaneous, Daily  promethazine (PHENERGAN) tablet 12.5 mg, 12.5 mg, Oral, Q6H PRN **OR** ondansetron (ZOFRAN) injection 4 mg, 4 mg, Intravenous, Q6H PRN  polyethylene glycol (GLYCOLAX) packet 17 g, 17 g, Oral, Daily PRN  acetaminophen (TYLENOL) tablet 650 mg, 650 mg, Oral, Q6H PRN **OR** acetaminophen (TYLENOL) suppository 650 mg, 650 mg, Rectal, Q6H PRN  HYDROmorphone (DILAUDID) injection 0.25 mg, 0.25 mg, Intravenous, Q4H PRN  pantoprazole (PROTONIX) injection 40 mg, 40 mg, Intravenous, BID  hydrALAZINE (APRESOLINE) injection 5 mg, 5 mg, Intravenous, Q6H PRN  docusate sodium (COLACE) capsule 100 mg, 100 mg, Oral, Daily  polyethylene glycol (GLYCOLAX) packet 17 g, 17 g, Oral, Daily  Prior to Admission medications    Medication Sig Start Date End Date Taking?  Authorizing Provider   diclofenac (VOLTAREN) 0.1 % ophthalmic solution Place 2 drops into the left eye 4 times daily   Yes Historical Provider, MD   prednisoLONE acetate (PRED FORTE) 1 % ophthalmic suspension Place 2 drops into the left eye 4 times daily   Yes Historical Provider, MD   moxifloxacin (VIGAMOX) 0.5 % ophthalmic solution Place 2 drops into the left eye 4 times daily    Yes Historical Provider, MD   propranolol (INDERAL) 10 MG tablet Take 10 mg by mouth 3 times daily   Yes Historical Provider, MD   atorvastatin (LIPITOR) 20 MG tablet Take 20 mg by mouth daily   Yes Historical Provider, MD   FLUoxetine (PROZAC) 20 MG capsule Take 10 mg by mouth daily    Yes Historical Provider, MD   losartan (COZAAR) 25 MG tablet Take 50 mg by mouth daily    Yes Historical Provider, MD   acetaminophen (TYLENOL) 325 MG tablet Take 650 mg by mouth every 6 hours as needed for Pain    Historical Provider, MD   ibuprofen (ADVIL;MOTRIN) 200 MG tablet Take 200 mg by mouth every 6 hours INR: No results for input(s): INR in the last 72 hours. Radiology Review: Images personally reviewed by me. HIDA - no gallbladder filling      IMPRESSION/RECOMMENDATIONS:    68 yo with acalculous cholecystitis  1. Discussed her diagnosis and plan  2. As improving with abx ok to discharge on oral abx today  3.   Plan for lap radha in several weeks - having second cataract operation on 5/26 so will wait until early June    Electronically signed by Magdaleno Ortega, [address] [place] [address]  [phone]

## 2021-05-16 NOTE — PROGRESS NOTES
D/c instructions given to pt with prescriptions. Verbalized understanding of instructions and and new medications. D/c per foot to private car in stable condition.

## 2021-05-16 NOTE — ED PROVIDER NOTES
Emergency Department Provider Note     Location: [facility]  5/13/2021    I independently performed a history and physical on Soundsupply. All diagnostic, treatment, and disposition decisions were made by myself in conjunction with the mid-level provider. Briefly, this is a 69 y.o. female here for abdominal pain    ED Triage Vitals [05/13/21 0949]   BP Temp Temp Source Pulse Resp SpO2 Height Weight   138/87 97.9 °F (36.6 °C) Oral 55 16 100 % 5' 3\" (1.6 m) 139 lb (63 kg)        Patient resting comfortably in no acute distress. Heart is regular rate and rhythm. Lungs clear to auscultation bilaterally. Abdomen is soft, tenderness right upper quadrant and epigastric area; nondistended, and nontender. No peripheral edema noted. Patient seen and examined. No results found.       Results for orders placed or performed during the hospital encounter of 05/13/21   COVID-19 & Influenza Combo    Specimen: Nasopharyngeal Swab   Result Value Ref Range    SARS-CoV-2 RNA, RT PCR NOT DETECTED NOT DETECTED    INFLUENZA A NOT DETECTED NOT DETECTED    INFLUENZA B NOT DETECTED NOT DETECTED   CBC Auto Differential   Result Value Ref Range    WBC 5.1 4.0 - 11.0 K/uL    RBC 4.03 4.00 - 5.20 M/uL    Hemoglobin 12.7 12.0 - 16.0 g/dL    Hematocrit 37.2 36.0 - 48.0 %    MCV 92.2 80.0 - 100.0 fL    MCH 31.4 26.0 - 34.0 pg    MCHC 34.0 31.0 - 36.0 g/dL    RDW 13.4 12.4 - 15.4 %    Platelets 575 470 - 618 K/uL    MPV 7.0 5.0 - 10.5 fL    Neutrophils % 72.7 %    Lymphocytes % 17.2 %    Monocytes % 8.3 %    Eosinophils % 1.4 %    Basophils % 0.4 %    Neutrophils Absolute 3.7 1.7 - 7.7 K/uL    Lymphocytes Absolute 0.9 (L) 1.0 - 5.1 K/uL    Monocytes Absolute 0.4 0.0 - 1.3 K/uL    Eosinophils Absolute 0.1 0.0 - 0.6 K/uL    Basophils Absolute 0.0 0.0 - 0.2 K/uL   Comprehensive Metabolic Panel w/ Reflex to MG   Result Value Ref Range    Sodium 135 (L) 136 - 145 mmol/L    Potassium reflex Magnesium 4.0 3.5 - 5.1 mmol/L    Chloride 100 99 - 110 mmol/L    CO2 27 21 - 32 mmol/L    Anion Gap 8 3 - 16    Glucose 105 (H) 70 - 99 mg/dL    BUN 12 7 - 20 mg/dL    CREATININE 0.7 0.6 - 1.2 mg/dL    GFR Non-African American >60 >60    GFR African American >60 >60    Calcium 9.4 8.3 - 10.6 mg/dL    Total Protein 6.5 6.4 - 8.2 g/dL    Albumin 4.1 3.4 - 5.0 g/dL    Albumin/Globulin Ratio 1.7 1.1 - 2.2    Total Bilirubin 0.3 0.0 - 1.0 mg/dL    Alkaline Phosphatase 44 40 - 129 U/L    ALT 16 10 - 40 U/L    AST 22 15 - 37 U/L    Globulin 2.4 g/dL   Troponin   Result Value Ref Range    Troponin <0.01 <0.01 ng/mL   Lipase   Result Value Ref Range    Lipase 23.0 13.0 - 60.0 U/L   Brain Natriuretic Peptide   Result Value Ref Range    Pro- (H) 0 - 124 pg/mL   Urinalysis, reflex to microscopic   Result Value Ref Range    Color, UA Yellow Straw/Yellow    Clarity, UA Clear Clear    Glucose, Ur Negative Negative mg/dL    Bilirubin Urine Negative Negative    Ketones, Urine Negative Negative mg/dL    Specific Gravity, UA 1.015 1.005 - 1.030    Blood, Urine Negative Negative    pH, UA 7.5 5.0 - 8.0    Protein, UA Negative Negative mg/dL    Urobilinogen, Urine 0.2 <2.0 E.U./dL    Nitrite, Urine Negative Negative    Leukocyte Esterase, Urine Negative Negative    Microscopic Examination Not Indicated     Urine Type NotGiven    Lactic Acid, Plasma   Result Value Ref Range    Lactic Acid 0.7 0.4 - 2.0 mmol/L   D-dimer, quantitative   Result Value Ref Range    D-Dimer, Quant <200 0 - 229 ng/mL DDU   CBC auto differential   Result Value Ref Range    WBC 7.2 4.0 - 11.0 K/uL    RBC 3.93 (L) 4.00 - 5.20 M/uL    Hemoglobin 12.4 12.0 - 16.0 g/dL    Hematocrit 36.8 36.0 - 48.0 %    MCV 93.7 80.0 - 100.0 fL    MCH 31.6 26.0 - 34.0 pg    MCHC 33.7 31.0 - 36.0 g/dL    RDW 13.7 12.4 - 15.4 %    Platelets 104 128 - 783 K/uL    MPV 7.0 5.0 - 10.5 fL    Neutrophils % 72.5 %    Lymphocytes % 18.1 %    Monocytes % 8.8 %    Eosinophils % 0.4 %    Basophils % 0.2 % Neutrophils Absolute 5.2 1.7 - 7.7 K/uL    Lymphocytes Absolute 1.3 1.0 - 5.1 K/uL    Monocytes Absolute 0.6 0.0 - 1.3 K/uL    Eosinophils Absolute 0.0 0.0 - 0.6 K/uL    Basophils Absolute 0.0 0.0 - 0.2 K/uL   Procalcitonin   Result Value Ref Range    Procalcitonin 0.02 0.00 - 0.15 ng/mL   EKG 12 Lead   Result Value Ref Range    Ventricular Rate 48 BPM    Atrial Rate 48 BPM    P-R Interval 180 ms    QRS Duration 78 ms    Q-T Interval 470 ms    QTc Calculation (Bazett) 419 ms    P Axis 20 degrees    R Axis -9 degrees    T Axis 57 degrees    Diagnosis       Marked sinus bradycardiaAbnormal ECGConfirmed by RADHA BARRIOS, MADHURI ([facility]) on 5/13/2021 2:01:40 PM       For further details of [facility] emergency department encounter, please see [facility]'s documentation. 1. Pain of upper abdomen    2. Abnormal gallbladder ultrasound          This chart was generated in part by using Dragon Dictation system and may contain errors related to that system including errors in grammar, punctuation, and spelling, as well as words and phrases that may be inappropriate. If there are any questions or concerns please feel free to contact the dictating provider for clarification.            Abbi Woodard MD  05/16/21 [id number]

## 2021-05-16 NOTE — PROGRESS NOTES
Resting in bed awake. No complaints or needs at present time. AM assessment complete. Alert and oriented. Call light in reach. Patient is able to demonstrate the ability to move from a reclining position to an upright position within the recliner. Bedside Mobility Assessment Tool (BMAT):     Assessment Level 1- Sit and Shake    1. From a semi-reclined position, ask patient to sit up and rotate to a seated position at the side of the bed. Can use the bedrail. 2. Ask patient to reach out and grab your hand and shake making sure patient reaches across his/her midline. Pass- Patient is able to come to a seated position, maintain core strength. Maintains seated balance while reaching across midline. Move on to Assessment Level 2. Assessment Level 2- Stretch and Point   1. With patient in seated position at the side of the bed, have patient place both feet on the floor (or stool) with knees no higher than hips. 2. Ask patient to stretch one leg and straighten the knee, then bend the ankle/flex and point the toes. If appropriate, repeat with the other leg. Pass- Patient is able to demonstrate appropriate quad strength on intended weight bearing limb(s). Move onto Assessment Level 3. Assessment Level 3- Stand   1. Ask patient to elevate off the bed or chair (seated to standing) using an assistive device (cane, bedrail). 2. Patient should be able to raise buttocks off be and hold for a count of five. May repeat once. Pass- Patient maintains standing stability for at least 5 seconds, proceed to assessment level 4. Assessment Level 4- Walk   1. Ask patient to march in place at bedside. 2. Then ask patient to advance step and return each foot. Some medical conditions may render a patient from stepping backwards, use your best clinical judgement.    Pass- Patient demonstrates balance while shifting weight and ability to step, takes independent steps, does not use assistive device patient is MOBILITY LEVEL 4.       Mobility Level- 4

## 2021-05-16 NOTE — CARE COORDINATION
DISCHARGE ORDER  Date/Time 2021 11:35 AM  Completed by: Braeden Mariscal RN, Case Management    Patient Name: Cristiane Casey      : 1950  Admitting Diagnosis: Abdominal pain [R10.9]      Admit order Date and Status: IP 2021  (verify MD's last order for status of admission)      Noted discharge order. If applicable PT/OT recommendation at Discharge: NA  DME recommendation by PT/OT: NA  Confirmed discharge plan with patient at bedside  Discharge Plan: Chart reviewed. Met with pt at bedside and explained the role of the CM. Pt will DC home today w/ sp. No HHC or DME needs. No barriers to getting RX filled. Reviewed chart. Role of discharge planner explained and patient verbalized understanding. Discharge order is noted. Has Home O2 in place on admit:  No  Informed of need to bring portable home O2 tank on day of discharge for nursing to connect prior to leaving:   No  Verbalized agreement/Understanding:   Yes, No  Pt is being d/c'd to home today. Pt's O2 sats are 96% on RA. Discharge timeout done with Rafiq Aguilar RN. All discharge needs and concerns addressed.

## 2021-05-17 ENCOUNTER — TELEPHONE (OUTPATIENT)
Dept: SURGERY | Age: 71
End: 2021-05-17

## 2021-05-17 NOTE — TELEPHONE ENCOUNTER
Per Dr. Krish Galvin- schedule patient for lap radha in 2 wks (first week of June), started antiobiotics over the weekend.  If she would like to stay at [place] for surgery, will need to follow up with Dr. Irena Franz or Dr. Tarri Hamman

## 2021-05-17 NOTE — LETTER
Surgery Scheduling Form:  DEMOGRAPHICS:                                                                                                         .  Patient Name:  Aashish Rowe  Patient :  1950   Patient SS#:      Patient Phone:  [phone] (home)                         Alt. Patient Phone:                 Patient Address:  [address]  PCP:  GARY Tsai CNP  Insurance:  Payor: [employer] / Plan: [employer] [address] / Product Type: *No Product type* /        Insurance ID Number:    Payor/Plan Subscr  Sex Relation Sub. Ins. ID Effective Group Num   1. [facility] 1950 Female Self [id number] 1/1/15 [id number]                                   [address]   2. MEDICARE - MEOdessia Holstein 1950 Female Self [id number] 1/1/15                                    [address] [address]     Interpretor Needed:  (NO)  (TYPE)           LATEX ALLERGY:  (NO)  Allergies: no allergies  Defibulator or Pacemaker:  (NO)    DIAGNOSIS & PROCEDURE:                                                                                       .  Diagnosis Code/Description:   Cholecystitis K80.20  Operation Code/Description:  Laparoscopic cholecystectomy with intraoperative cholangiogram, possible open 18071  Location:  [facility]              Surgeon:  Dr. Montoya Arm:                                                                                    .  Surgeon's Scheduling Instruction:  elective  Requested Date: 21     OR Time: 730 am            Patient Arrival Time: 6 am  OR Time Required:  90  Minutes  Anesthesia:  General       Equipment:  c-arm                                                            SA Required (only for Mac and Gen): yes  Status:  Outpatient        Standard C-Arm (only for port and radha):  yes   Mini C-Arm: No   PAT Required:   Yes H&P needs to be completed: No, hospitalist   Cardiac Clearance Requested:  (NO)  Covid vaccine completed: 2/2021               PRE-CERTIFICATION INFORMATION:                                                                           .  Procedure/CPT code: Laparoscopic cholecystectomy with intraoperative cholangiogram, possible open 03676        Modifier:

## 2021-05-18 NOTE — PROGRESS NOTES
Federico Rendonwig    Age 69 y.o.    female    1950    MRN [id number]    6/4/2021  Arrival Time_____________  OR Time____________115 Min     Procedure(s):  LAPAROSCOPIC CHOLECYSTECTOMY WITH INTRAOPERATIVE CHOLANGIOGRAM, POSSIBLE OPEN PROCEDURE                      General     Surgeon(s):  Criss Price, MD      DAY ADMIT ___  SDS/OP ___  OUTPT IN BED ___         Phone [phone] (home)    PCP _____________________ Phone_________________ Epic ( ) Epic CE ( ) Appt ________    ADDITIONAL INFO __________________________________ Cardio/Consult _____________    NOTES _____________________________________________________________________    ____________________________________________________________________________    PAT APPT DATE:________ TIME: ________  FAXED QAD: _______  (__) H&P w/ hospitalist  ____________________________________________________________________________    COVID TEST: Date/Location______________        NURSING HISTORY COMPLETE: _______  (__) CBC       (__) W/ DIFF ___________  (__)  ECHO    __________  (__) Hgb A1C    ___________  (__) CHEST X RAY   __________  (__) LIPID PROFILE  ___________  (__) EKG   __________  (__) PT/PTT   ___________  (__) PFT's   __________  (__) BMP   ___________  (__) CAROTIDS  __________  (__) CMP   ___________  (__) VEIN MAPPING  __________  (__) U/A   ___________  (__) HISTORY & PHYSICAL __________  (__) URINE C & S  ___________  (__) CARDIAC CLEARANCE __________  (__) U/A W/ FLEX  ___________  (__) PULM.  CLEARANCE __________  (__) SERUM PREGNANCY ___________  (__) Check Epic DOS orders __________  (__) TYPE & SCREEN ________ repeat ( ) (__)  __________________ __________  (__) ALBUMIN   ___________  (__)  __________________ __________  (__) TRANSFERRIN  ___________  (__)  __________________ __________  (__) LIVER PROFILE  ___________  (__)  __________________ __________  (__) CARBOXY HGB  ___________  (__) URINE PREG DOS __________  (__) NICOTINE & MET. ___________  (__) BLOOD SUGAR DOS __________  (__) PREALBUMIN  ___________    (__) MRSA NASAL SWAB ___________  (__) BLOOD THINNERS __________  (__) ACE/ ARBS: _____________________    (__) BETABLOCKERS ___________________

## 2021-05-19 NOTE — DISCHARGE SUMMARY
nonvisualized gallbladder would   include chronic cholecystitis, prolonged fasting or recent meal, alcohol use   and pancreatitis. 2. No evidence of biliary obstruction. US GALLBLADDER RUQ 5/13/2021   Final Result   1. Sonographic Vu side with borderline distended gallbladder. No   obstructing gallstones identified. Findings could be seen in acalculous   cholecystitis. Consider a HIDA scan for further confirmation. 2. The patient has gallbladder polyps, measuring less than 6 mm. If the   patient has risk factors for gallbladder carcinoma, follow-up ultrasound in 6   months is recommended. CT ABDOMEN PELVIS W IV CONTRAST Additional Contrast? None 5/13/2021   Final Result   Nonobstructing left nephrolithiasis. XR CHEST PORTABLE 5/13/2021   Final Result   No acute cardiopulmonary disease.            Discharge Medications     Medication List      START taking these medications    amoxicillin-clavulanate 875-125 MG per tablet  Commonly known as: AUGMENTIN  Take 1 tablet by mouth 2 times daily for 7 days  Notes to patient: Augmentin  Use: treat bacterial infections  Side effects:rash; hives; itching; shortness of breath; wheezing; cough     lactobacillus capsule  Take 1 capsule by mouth 2 times daily     pantoprazole 40 MG tablet  Commonly known as: PROTONIX  Take 1 tablet by mouth daily        CONTINUE taking these medications    acetaminophen 325 MG tablet  Commonly known as: TYLENOL     atorvastatin 20 MG tablet  Commonly known as: LIPITOR     diclofenac 0.1 % ophthalmic solution  Commonly known as: VOLTAREN     FLUoxetine 20 MG capsule  Commonly known as: PROZAC     losartan 25 MG tablet  Commonly known as: COZAAR     moxifloxacin 0.5 % ophthalmic solution  Commonly known as: VIGAMOX     prednisoLONE acetate 1 % ophthalmic suspension  Commonly known as: PRED FORTE     propranolol 10 MG tablet  Commonly known as: INDERAL        STOP taking these medications    ibuprofen 200 MG tablet  Commonly known as: ADVIL;MOTRIN           Where to Get Your Medications      You can get these medications from any pharmacy    Bring a paper prescription for each of these medications  · amoxicillin-clavulanate 875-125 MG per tablet  · lactobacillus capsule  · pantoprazole 40 MG tablet           Discharged in stable condition to home. Total time 35 minutes. > 50%  dominated by counseling and coordination of care. D/W surgeon . patient will follow up for rashard Spann DC on Po abx       Follow Up: Follow up with PCP in 1 week.     Robbin Betancourt MD  5/16/21

## 2021-05-27 RX ORDER — IBUPROFEN 200 MG
200 TABLET ORAL EVERY 6 HOURS PRN
COMMUNITY

## 2021-05-27 RX ORDER — MECOBALAMIN 5000 MCG
5 TABLET,DISINTEGRATING ORAL NIGHTLY
COMMUNITY

## 2021-05-27 RX ORDER — POLYETHYLENE GLYCOL 3350 17 G/17G
17 POWDER, FOR SOLUTION ORAL DAILY
COMMUNITY
End: 2021-06-14 | Stop reason: ALTCHOICE

## 2021-05-27 NOTE — PROGRESS NOTES
Preoperative Screening for Elective Surgery/Invasive Procedures While COVID-19 present in the community     Have you had any of the following symptoms? o Fever, chills  o Cough  o Shortness of breath  o Muscle aches/pain  o Diarrhea  o Abdominal pain, nausea, vomiting  o Loss or decrease in taste and / or smell   Risk of Exposure  o Have you recently been hospitalized for COVID-19 or flu-like illness, if so when?  o Recently diagnosed with COVID-19, if so when?  o Recently tested for COVID-19, if so when?  o Have you been in close contact with a person or family member who currently has or recently had COVID-19? If yes, when and in what context?  o Do you live with anybody who in the last 14 days has had fever, chills, shortness of breath, muscle aches, flu-like illness?  o Do you have any close contacts or family members who are currently in the hospital for COVID-19 or flu-like illness? If yes, assess recent close contact with this person. Indicate if the patient has a positive screen by answering yes to one or more of the above questions. Patients who test positive or screen positive prior to surgery or on the day of surgery should be evaluated in conjunction with the surgeon/proceduralist/anesthesiologist to determine the urgency of the procedure.      NO TO ALL ABOVE  VACCINE 2ND 2/2021

## 2021-06-02 ENCOUNTER — ANESTHESIA EVENT (OUTPATIENT)
Dept: OPERATING ROOM | Age: 71
End: 2021-06-02
Payer: MEDICARE

## 2021-06-04 ENCOUNTER — APPOINTMENT (OUTPATIENT)
Dept: GENERAL RADIOLOGY | Age: 71
End: 2021-06-04
Attending: SURGERY
Payer: MEDICARE

## 2021-06-04 ENCOUNTER — HOSPITAL ENCOUNTER (OUTPATIENT)
Age: 71
Setting detail: OUTPATIENT SURGERY
Discharge: HOME | End: 2021-06-04
Attending: SURGERY | Admitting: SURGERY
Payer: MEDICARE

## 2021-06-04 ENCOUNTER — ANESTHESIA (OUTPATIENT)
Dept: OPERATING ROOM | Age: 71
End: 2021-06-04
Payer: MEDICARE

## 2021-06-04 VITALS
WEIGHT: 142.4 LBS | DIASTOLIC BLOOD PRESSURE: 65 MMHG | HEART RATE: 59 BPM | BODY MASS INDEX: 25.23 KG/M2 | TEMPERATURE: 96.9 F | SYSTOLIC BLOOD PRESSURE: 134 MMHG | RESPIRATION RATE: 21 BRPM | OXYGEN SATURATION: 93 % | HEIGHT: 63 IN

## 2021-06-04 VITALS
RESPIRATION RATE: 2 BRPM | DIASTOLIC BLOOD PRESSURE: 86 MMHG | TEMPERATURE: 97.2 F | OXYGEN SATURATION: 100 % | SYSTOLIC BLOOD PRESSURE: 144 MMHG

## 2021-06-04 DIAGNOSIS — G89.18 POSTOPERATIVE PAIN: Primary | ICD-10-CM

## 2021-06-04 DIAGNOSIS — K80.20 CALCULUS OF GALLBLADDER WITHOUT CHOLECYSTITIS WITHOUT OBSTRUCTION: ICD-10-CM

## 2021-06-04 PROCEDURE — 47563 LAPARO CHOLECYSTECTOMY/GRAPH: CPT | Performed by: SURGERY

## 2021-06-04 PROCEDURE — 74300 X-RAY BILE DUCTS/PANCREAS: CPT | Performed by: SURGERY

## 2021-06-04 PROCEDURE — 3209999900 FLUORO FOR SURGICAL PROCEDURES

## 2021-06-04 PROCEDURE — 7100000000 HC PACU RECOVERY - FIRST 15 MIN: Performed by: SURGERY

## 2021-06-04 PROCEDURE — 6360000004 HC RX CONTRAST MEDICATION: Performed by: SURGERY

## 2021-06-04 PROCEDURE — 3700000001 HC ADD 15 MINUTES (ANESTHESIA): Performed by: SURGERY

## 2021-06-04 PROCEDURE — 6360000002 HC RX W HCPCS: Performed by: ANESTHESIOLOGY

## 2021-06-04 PROCEDURE — 2500000003 HC RX 250 WO HCPCS: Performed by: NURSE ANESTHETIST, CERTIFIED REGISTERED

## 2021-06-04 PROCEDURE — 2580000003 HC RX 258: Performed by: ANESTHESIOLOGY

## 2021-06-04 PROCEDURE — 3600000005 HC SURGERY LEVEL 5 BASE: Performed by: SURGERY

## 2021-06-04 PROCEDURE — 2709999900 HC NON-CHARGEABLE SUPPLY: Performed by: SURGERY

## 2021-06-04 PROCEDURE — 7100000001 HC PACU RECOVERY - ADDTL 15 MIN: Performed by: SURGERY

## 2021-06-04 PROCEDURE — 74300 X-RAY BILE DUCTS/PANCREAS: CPT

## 2021-06-04 PROCEDURE — 6360000002 HC RX W HCPCS: Performed by: NURSE ANESTHETIST, CERTIFIED REGISTERED

## 2021-06-04 PROCEDURE — 2580000003 HC RX 258: Performed by: SURGERY

## 2021-06-04 PROCEDURE — 88304 TISSUE EXAM BY PATHOLOGIST: CPT

## 2021-06-04 PROCEDURE — 3700000000 HC ANESTHESIA ATTENDED CARE: Performed by: SURGERY

## 2021-06-04 PROCEDURE — 2500000003 HC RX 250 WO HCPCS: Performed by: SURGERY

## 2021-06-04 PROCEDURE — 7100000010 HC PHASE II RECOVERY - FIRST 15 MIN: Performed by: SURGERY

## 2021-06-04 PROCEDURE — 7100000011 HC PHASE II RECOVERY - ADDTL 15 MIN: Performed by: SURGERY

## 2021-06-04 PROCEDURE — 3600000015 HC SURGERY LEVEL 5 ADDTL 15MIN: Performed by: SURGERY

## 2021-06-04 DEVICE — Z INACTIVE USE 2641839 CLIP INT M L POLYMER LOK LIG HEM O LOK: Type: IMPLANTABLE DEVICE | Status: FUNCTIONAL

## 2021-06-04 RX ORDER — PROMETHAZINE HYDROCHLORIDE 25 MG/ML
6.25 INJECTION, SOLUTION INTRAMUSCULAR; INTRAVENOUS
Status: COMPLETED | OUTPATIENT
Start: 2021-06-04 | End: 2021-06-04

## 2021-06-04 RX ORDER — SODIUM CHLORIDE, SODIUM LACTATE, POTASSIUM CHLORIDE, CALCIUM CHLORIDE 600; 310; 30; 20 MG/100ML; MG/100ML; MG/100ML; MG/100ML
INJECTION, SOLUTION INTRAVENOUS CONTINUOUS
Status: DISCONTINUED | OUTPATIENT
Start: 2021-06-04 | End: 2021-06-04 | Stop reason: HOSPADM

## 2021-06-04 RX ORDER — LABETALOL HYDROCHLORIDE 5 MG/ML
5 INJECTION, SOLUTION INTRAVENOUS EVERY 10 MIN PRN
Status: DISCONTINUED | OUTPATIENT
Start: 2021-06-04 | End: 2021-06-04 | Stop reason: HOSPADM

## 2021-06-04 RX ORDER — OXYCODONE HYDROCHLORIDE AND ACETAMINOPHEN 5; 325 MG/1; MG/1
1 TABLET ORAL PRN
Status: DISCONTINUED | OUTPATIENT
Start: 2021-06-04 | End: 2021-06-04 | Stop reason: HOSPADM

## 2021-06-04 RX ORDER — MORPHINE SULFATE 2 MG/ML
1 INJECTION, SOLUTION INTRAMUSCULAR; INTRAVENOUS EVERY 5 MIN PRN
Status: DISCONTINUED | OUTPATIENT
Start: 2021-06-04 | End: 2021-06-04 | Stop reason: HOSPADM

## 2021-06-04 RX ORDER — ROCURONIUM BROMIDE 10 MG/ML
INJECTION, SOLUTION INTRAVENOUS PRN
Status: DISCONTINUED | OUTPATIENT
Start: 2021-06-04 | End: 2021-06-04 | Stop reason: SDUPTHER

## 2021-06-04 RX ORDER — APREPITANT 40 MG/1
40 CAPSULE ORAL ONCE
Status: COMPLETED | OUTPATIENT
Start: 2021-06-04 | End: 2021-06-04

## 2021-06-04 RX ORDER — ONDANSETRON 2 MG/ML
INJECTION INTRAMUSCULAR; INTRAVENOUS PRN
Status: DISCONTINUED | OUTPATIENT
Start: 2021-06-04 | End: 2021-06-04 | Stop reason: SDUPTHER

## 2021-06-04 RX ORDER — MORPHINE SULFATE 2 MG/ML
2 INJECTION, SOLUTION INTRAMUSCULAR; INTRAVENOUS EVERY 5 MIN PRN
Status: DISCONTINUED | OUTPATIENT
Start: 2021-06-04 | End: 2021-06-04 | Stop reason: HOSPADM

## 2021-06-04 RX ORDER — HYDRALAZINE HYDROCHLORIDE 20 MG/ML
5 INJECTION INTRAMUSCULAR; INTRAVENOUS EVERY 10 MIN PRN
Status: DISCONTINUED | OUTPATIENT
Start: 2021-06-04 | End: 2021-06-04 | Stop reason: HOSPADM

## 2021-06-04 RX ORDER — BUPIVACAINE HYDROCHLORIDE AND EPINEPHRINE 5; 5 MG/ML; UG/ML
INJECTION, SOLUTION PERINEURAL PRN
Status: DISCONTINUED | OUTPATIENT
Start: 2021-06-04 | End: 2021-06-04 | Stop reason: ALTCHOICE

## 2021-06-04 RX ORDER — SODIUM CHLORIDE 0.9 % (FLUSH) 0.9 %
10 SYRINGE (ML) INJECTION EVERY 12 HOURS SCHEDULED
Status: DISCONTINUED | OUTPATIENT
Start: 2021-06-04 | End: 2021-06-04 | Stop reason: HOSPADM

## 2021-06-04 RX ORDER — PROPOFOL 10 MG/ML
INJECTION, EMULSION INTRAVENOUS PRN
Status: DISCONTINUED | OUTPATIENT
Start: 2021-06-04 | End: 2021-06-04 | Stop reason: SDUPTHER

## 2021-06-04 RX ORDER — OXYCODONE HYDROCHLORIDE 5 MG/1
5-10 TABLET ORAL EVERY 6 HOURS PRN
Qty: 16 TABLET | Refills: 0 | Status: SHIPPED | OUTPATIENT
Start: 2021-06-04 | End: 2021-06-09

## 2021-06-04 RX ORDER — SODIUM CHLORIDE 0.9 % (FLUSH) 0.9 %
10 SYRINGE (ML) INJECTION PRN
Status: DISCONTINUED | OUTPATIENT
Start: 2021-06-04 | End: 2021-06-04 | Stop reason: HOSPADM

## 2021-06-04 RX ORDER — SODIUM CHLORIDE 9 MG/ML
25 INJECTION, SOLUTION INTRAVENOUS PRN
Status: DISCONTINUED | OUTPATIENT
Start: 2021-06-04 | End: 2021-06-04 | Stop reason: HOSPADM

## 2021-06-04 RX ORDER — MEPERIDINE HYDROCHLORIDE 50 MG/ML
12.5 INJECTION INTRAMUSCULAR; INTRAVENOUS; SUBCUTANEOUS EVERY 5 MIN PRN
Status: DISCONTINUED | OUTPATIENT
Start: 2021-06-04 | End: 2021-06-04 | Stop reason: HOSPADM

## 2021-06-04 RX ORDER — LIDOCAINE HYDROCHLORIDE 20 MG/ML
INJECTION, SOLUTION INFILTRATION; PERINEURAL PRN
Status: DISCONTINUED | OUTPATIENT
Start: 2021-06-04 | End: 2021-06-04 | Stop reason: SDUPTHER

## 2021-06-04 RX ORDER — SODIUM CHLORIDE, SODIUM LACTATE, POTASSIUM CHLORIDE, AND CALCIUM CHLORIDE .6; .31; .03; .02 G/100ML; G/100ML; G/100ML; G/100ML
IRRIGANT IRRIGATION PRN
Status: DISCONTINUED | OUTPATIENT
Start: 2021-06-04 | End: 2021-06-04 | Stop reason: ALTCHOICE

## 2021-06-04 RX ORDER — ONDANSETRON 2 MG/ML
4 INJECTION INTRAMUSCULAR; INTRAVENOUS
Status: COMPLETED | OUTPATIENT
Start: 2021-06-04 | End: 2021-06-04

## 2021-06-04 RX ORDER — LIDOCAINE HYDROCHLORIDE 10 MG/ML
1 INJECTION, SOLUTION EPIDURAL; INFILTRATION; INTRACAUDAL; PERINEURAL
Status: DISCONTINUED | OUTPATIENT
Start: 2021-06-04 | End: 2021-06-04 | Stop reason: HOSPADM

## 2021-06-04 RX ORDER — DIPHENHYDRAMINE HYDROCHLORIDE 50 MG/ML
12.5 INJECTION INTRAMUSCULAR; INTRAVENOUS
Status: DISCONTINUED | OUTPATIENT
Start: 2021-06-04 | End: 2021-06-04 | Stop reason: HOSPADM

## 2021-06-04 RX ORDER — EPHEDRINE SULFATE 50 MG/ML
INJECTION, SOLUTION INTRAVENOUS PRN
Status: DISCONTINUED | OUTPATIENT
Start: 2021-06-04 | End: 2021-06-04 | Stop reason: SDUPTHER

## 2021-06-04 RX ORDER — DEXAMETHASONE SODIUM PHOSPHATE 10 MG/ML
INJECTION INTRAMUSCULAR; INTRAVENOUS PRN
Status: DISCONTINUED | OUTPATIENT
Start: 2021-06-04 | End: 2021-06-04 | Stop reason: SDUPTHER

## 2021-06-04 RX ORDER — FENTANYL CITRATE 50 UG/ML
INJECTION, SOLUTION INTRAMUSCULAR; INTRAVENOUS PRN
Status: DISCONTINUED | OUTPATIENT
Start: 2021-06-04 | End: 2021-06-04 | Stop reason: SDUPTHER

## 2021-06-04 RX ORDER — OXYCODONE HYDROCHLORIDE AND ACETAMINOPHEN 5; 325 MG/1; MG/1
2 TABLET ORAL PRN
Status: DISCONTINUED | OUTPATIENT
Start: 2021-06-04 | End: 2021-06-04 | Stop reason: HOSPADM

## 2021-06-04 RX ADMIN — ROCURONIUM BROMIDE 50 MG: 10 SOLUTION INTRAVENOUS at 07:35

## 2021-06-04 RX ADMIN — ONDANSETRON 4 MG: 2 INJECTION INTRAMUSCULAR; INTRAVENOUS at 07:43

## 2021-06-04 RX ADMIN — SUGAMMADEX 200 MG: 100 INJECTION, SOLUTION INTRAVENOUS at 08:15

## 2021-06-04 RX ADMIN — SODIUM CHLORIDE, SODIUM LACTATE, POTASSIUM CHLORIDE, AND CALCIUM CHLORIDE: .6; .31; .03; .02 INJECTION, SOLUTION INTRAVENOUS at 07:30

## 2021-06-04 RX ADMIN — HYDROMORPHONE HYDROCHLORIDE 0.5 MG: 1 INJECTION, SOLUTION INTRAMUSCULAR; INTRAVENOUS; SUBCUTANEOUS at 08:57

## 2021-06-04 RX ADMIN — ONDANSETRON 4 MG: 2 INJECTION INTRAMUSCULAR; INTRAVENOUS at 08:48

## 2021-06-04 RX ADMIN — EPHEDRINE SULFATE 10 MG: 50 INJECTION INTRAMUSCULAR; INTRAVENOUS; SUBCUTANEOUS at 07:46

## 2021-06-04 RX ADMIN — PROPOFOL 20 MG: 10 INJECTION, EMULSION INTRAVENOUS at 07:31

## 2021-06-04 RX ADMIN — DEXAMETHASONE SODIUM PHOSPHATE 6 MG: 10 INJECTION INTRAMUSCULAR; INTRAVENOUS at 07:42

## 2021-06-04 RX ADMIN — PROPOFOL 20 MG: 10 INJECTION, EMULSION INTRAVENOUS at 07:57

## 2021-06-04 RX ADMIN — EPHEDRINE SULFATE 10 MG: 50 INJECTION INTRAMUSCULAR; INTRAVENOUS; SUBCUTANEOUS at 07:41

## 2021-06-04 RX ADMIN — PROPOFOL 30 MG: 10 INJECTION, EMULSION INTRAVENOUS at 07:56

## 2021-06-04 RX ADMIN — PROPOFOL 60 MG: 10 INJECTION, EMULSION INTRAVENOUS at 07:35

## 2021-06-04 RX ADMIN — FENTANYL CITRATE 50 MCG: 50 INJECTION INTRAMUSCULAR; INTRAVENOUS at 07:35

## 2021-06-04 RX ADMIN — APREPITANT 40 MG: 40 CAPSULE ORAL at 06:40

## 2021-06-04 RX ADMIN — PROMETHAZINE HYDROCHLORIDE 6.25 MG: 25 INJECTION INTRAMUSCULAR; INTRAVENOUS at 08:55

## 2021-06-04 RX ADMIN — LIDOCAINE HYDROCHLORIDE 30 MG: 20 INJECTION, SOLUTION INFILTRATION; PERINEURAL at 07:35

## 2021-06-04 ASSESSMENT — PULMONARY FUNCTION TESTS
PIF_VALUE: 19
PIF_VALUE: 20
PIF_VALUE: 27
PIF_VALUE: 19
PIF_VALUE: 14
PIF_VALUE: 13
PIF_VALUE: 14
PIF_VALUE: 13
PIF_VALUE: 1
PIF_VALUE: 12
PIF_VALUE: 19
PIF_VALUE: 14
PIF_VALUE: 19
PIF_VALUE: 13
PIF_VALUE: 13
PIF_VALUE: 21
PIF_VALUE: 21
PIF_VALUE: 0
PIF_VALUE: 13
PIF_VALUE: 14
PIF_VALUE: 13
PIF_VALUE: 20
PIF_VALUE: 0
PIF_VALUE: 1
PIF_VALUE: 13
PIF_VALUE: 14
PIF_VALUE: 1
PIF_VALUE: 14
PIF_VALUE: 19
PIF_VALUE: 13
PIF_VALUE: 14
PIF_VALUE: 0
PIF_VALUE: 18
PIF_VALUE: 20
PIF_VALUE: 13
PIF_VALUE: 20
PIF_VALUE: 13
PIF_VALUE: 12
PIF_VALUE: 21
PIF_VALUE: 19
PIF_VALUE: 26
PIF_VALUE: 3
PIF_VALUE: 14
PIF_VALUE: 1
PIF_VALUE: 13
PIF_VALUE: 13
PIF_VALUE: 2
PIF_VALUE: 13
PIF_VALUE: 13
PIF_VALUE: 5
PIF_VALUE: 20
PIF_VALUE: 25
PIF_VALUE: 20

## 2021-06-04 ASSESSMENT — PAIN SCALES - GENERAL
PAINLEVEL_OUTOF10: 5
PAINLEVEL_OUTOF10: 7
PAINLEVEL_OUTOF10: 0
PAINLEVEL_OUTOF10: 0

## 2021-06-04 NOTE — PROGRESS NOTES
Pt to PACU. VSS.  Bedside report from [facility]

## 2021-06-04 NOTE — PROGRESS NOTES
Pt still nauseated after nausea medications, aromatherapy given to pt to help with nausea.  Will continue to monitor

## 2021-06-04 NOTE — PROGRESS NOTES
Discharge instructions reviewed with Boubacar Larios, pt's . Boubacar Larios verbalized understanding. Boubacar Larios will stop at outpatient pharmacy for pt's rx.

## 2021-06-04 NOTE — ANESTHESIA PRE PROCEDURE
Department of Anesthesiology  Preprocedure Note       Name:  Jacques Hall   Age:  69 y.o.  :  1950                                          MRN:  [id number]         Date:  2021      Surgeon: Ascencion De La O):  Nga Ortiz MD    Procedure: Procedure(s):  LAPAROSCOPIC CHOLECYSTECTOMY WITH INTRAOPERATIVE CHOLANGIOGRAM, POSSIBLE OPEN PROCEDURE    Medications prior to admission:   Prior to Admission medications    Medication Sig Start Date End Date Taking?  Authorizing Provider   Calcium Polycarbophil (FIBER-CAPS PO) Take 2 capsules by mouth daily   Yes Historical Provider, MD   polyethylene glycol (MIRALAX) 17 GM/SCOOP powder Take 17 g by mouth daily   Yes Historical Provider, MD   melatonin 5 MG TBDP disintegrating tablet Take 5 mg by mouth nightly   Yes Historical Provider, MD   ibuprofen (ADVIL;MOTRIN) 200 MG tablet Take 200 mg by mouth every 6 hours as needed for Pain   Yes Historical Provider, MD   pantoprazole (PROTONIX) 40 MG tablet Take 1 tablet by mouth daily 21  Yes Veclay Kidney, MD   diclofenac (VOLTAREN) 0.1 % ophthalmic solution Place 2 drops into the left eye 4 times daily   Yes Historical Provider, MD   prednisoLONE acetate (PRED FORTE) 1 % ophthalmic suspension Place 2 drops into the left eye 4 times daily   Yes Historical Provider, MD   moxifloxacin (VIGAMOX) 0.5 % ophthalmic solution Place 2 drops into the left eye 4 times daily    Yes Historical Provider, MD   propranolol (INDERAL) 10 MG tablet Take 10 mg by mouth 2 times daily    Yes Historical Provider, MD   atorvastatin (LIPITOR) 20 MG tablet Take 20 mg by mouth daily   Yes Historical Provider, MD   acetaminophen (TYLENOL) 325 MG tablet Take 650 mg by mouth every 6 hours as needed for Pain   Yes Historical Provider, MD   FLUoxetine (PROZAC) 20 MG capsule Take 10 mg by mouth daily    Yes Historical Provider, MD   losartan (COZAAR) 25 MG tablet Take 50 mg by mouth daily    Yes Historical Provider, MD       Current Anesthesia Evaluation   no history of anesthetic complications:   Airway: Mallampati: II  TM distance: <3 FB   Neck ROM: full  Mouth opening: > = 3 FB Dental: normal exam         Pulmonary:Negative Pulmonary ROS                              Cardiovascular:    (+) hypertension:, hyperlipidemia         Beta Blocker:  Dose within 24 Hrs         Neuro/Psych:   (+) headaches:,             GI/Hepatic/Renal:            ROS comment: cholecystitis. Endo/Other: Negative Endo/Other ROS   (+) malignancy/cancer. Abdominal:           Vascular: negative vascular ROS.  + DVT, . Anesthesia Plan      general     ASA 2     (Pt agrees to risks, benefits and alternatives of GETA. Questions answered. Willing to proceed with plan.)  Induction: intravenous. Anesthetic plan and risks discussed with patient.                       Julia Watkins MD   6/4/2021

## 2021-06-04 NOTE — OP NOTE
Date of Surgery: 6/4/21    Preop Dx:  Acute Cholecystitis    Postop Dx:  Same    Procedure:  Laparoscopic Cholecystectomy with Intraoperative Cholangiogram    Surgeon:  Lidia Lombard    Assistant:      Anesthesia:  GETA    EBL:   <50ml    Specimen:  gallbladder    Complications: none    Drains/Lines:  none    Indications:  70 yo with history of acute cholecystitis    Description:  Patient was given adequate description of the risks and rewards of the procedure, including bleeding, infection, injury to surrounding structures such as the common bile duct, need for further surgery, and possibility of open procedure and freely consented. She was given appropriate antibiotics and brought to the OR where general anesthesia was induced. She was placed in supine position. Prepped and draped in usual sterile fashion. Area above umbilicus injected with local anesthetic and #11 blade used to incise epidermis. This allowed passage of 5mm optiview trocar using zero degree laparoscope. Once this was inserted the abdomen was insufflated to 15 mmHg pressure with CO2. Patient then positioned in slight reverse Trendelenburg position. Thirty degree laparoscope inserted. Abdomen inspected and no acute inflammation seen. Area below xiphoid process and just to right of midline injected with local anesthetic and under direct visualization a 12mm trocar was inserted without issue. Then two 5mm trocars were inserted in the patient's right side using similar technique as the subxiphoid trocar. Gallbladder then grasped and retracted over dome of liver. Also grasped at infundibulum and retracted anterolaterally. Cystic duct then circumferentially dissected. One endoclip placed proximally on cystic duct, which was then partially divided. Cholangiogram catheter inserted into duct and cholangiogram performed. This demonstrated some filling of left and right hepatic ducts and flow into duodenum without signs of obstruction.   It was then removed. Three endoclips were placed on the distal cystic duct and it was completely divided. The cystic artery was then circumferentially dissected with two clips being placed proximal and one distal.  It was then divided. Using electrocautery the gallbladder was dissected free of the liver bed, placed into an endoretrieval bag, removed from the abdomen, and sent off as specimen. The liver bed was then inspected and made hemostatic with electrocautery. The area was copiously irrigated with all fluid suctioned off. The cystic duct and artery had clips intact without signs of leak of bleeding. Under direct visualization the trocars were removed and without bleeding at their insertion sites. The abdomen was allowed to desufflate. The subxiphoid trocar site had its fascia closed with 0-0 vicryl figure of eight stitch. All of the trocar sites had the epidermis closed with 4-0 monocryl in the subcuticular plane. Sterile dressing placed. All suture, sponge and instrument count correct times two at end of case. Transferred to PACU in stable condition.     Alexis Pastor MD

## 2021-06-07 ENCOUNTER — HOSPITAL ENCOUNTER (EMERGENCY)
Age: 71
Discharge: HOME | End: 2021-06-07
Payer: MEDICARE

## 2021-06-07 ENCOUNTER — APPOINTMENT (OUTPATIENT)
Dept: CT IMAGING | Age: 71
End: 2021-06-07
Payer: MEDICARE

## 2021-06-07 VITALS
WEIGHT: 142 LBS | RESPIRATION RATE: 16 BRPM | BODY MASS INDEX: 25.15 KG/M2 | OXYGEN SATURATION: 94 % | HEART RATE: 99 BPM | TEMPERATURE: 100.5 F | SYSTOLIC BLOOD PRESSURE: 148 MMHG | DIASTOLIC BLOOD PRESSURE: 83 MMHG

## 2021-06-07 DIAGNOSIS — Z98.890 POSTOPERATIVE NAUSEA AND VOMITING: ICD-10-CM

## 2021-06-07 DIAGNOSIS — Z90.49 STATUS POST CHOLECYSTECTOMY: Primary | ICD-10-CM

## 2021-06-07 DIAGNOSIS — K91.89 ILEUS, POSTOPERATIVE (HCC): ICD-10-CM

## 2021-06-07 DIAGNOSIS — K56.7 ILEUS, POSTOPERATIVE (HCC): ICD-10-CM

## 2021-06-07 DIAGNOSIS — R11.2 POSTOPERATIVE NAUSEA AND VOMITING: ICD-10-CM

## 2021-06-07 LAB
A/G RATIO: 1.2 (ref 1.1–2.2)
ALBUMIN SERPL-MCNC: 4.1 G/DL (ref 3.4–5)
ALP BLD-CCNC: 49 U/L (ref 40–129)
ALT SERPL-CCNC: 40 U/L (ref 10–40)
ANION GAP SERPL CALCULATED.3IONS-SCNC: 13 MMOL/L (ref 3–16)
AST SERPL-CCNC: 33 U/L (ref 15–37)
BACTERIA: ABNORMAL /HPF
BASOPHILS ABSOLUTE: 0 K/UL (ref 0–0.2)
BASOPHILS RELATIVE PERCENT: 0.3 %
BILIRUB SERPL-MCNC: 0.7 MG/DL (ref 0–1)
BILIRUBIN URINE: NEGATIVE
BLOOD, URINE: ABNORMAL
BUN BLDV-MCNC: 8 MG/DL (ref 7–20)
CALCIUM SERPL-MCNC: 9.4 MG/DL (ref 8.3–10.6)
CHLORIDE BLD-SCNC: 93 MMOL/L (ref 99–110)
CLARITY: CLEAR
CO2: 25 MMOL/L (ref 21–32)
COLOR: YELLOW
CREAT SERPL-MCNC: 0.7 MG/DL (ref 0.6–1.2)
EOSINOPHILS ABSOLUTE: 0 K/UL (ref 0–0.6)
EOSINOPHILS RELATIVE PERCENT: 0.1 %
EPITHELIAL CELLS, UA: ABNORMAL /HPF (ref 0–5)
GFR AFRICAN AMERICAN: >60
GFR NON-AFRICAN AMERICAN: >60
GLOBULIN: 3.5 G/DL
GLUCOSE BLD-MCNC: 115 MG/DL (ref 70–99)
GLUCOSE URINE: NEGATIVE MG/DL
HCT VFR BLD CALC: 35.3 % (ref 36–48)
HEMOGLOBIN: 12.2 G/DL (ref 12–16)
KETONES, URINE: 40 MG/DL
LEUKOCYTE ESTERASE, URINE: NEGATIVE
LIPASE: 16 U/L (ref 13–60)
LYMPHOCYTES ABSOLUTE: 0.6 K/UL (ref 1–5.1)
LYMPHOCYTES RELATIVE PERCENT: 4.4 %
MCH RBC QN AUTO: 30.9 PG (ref 26–34)
MCHC RBC AUTO-ENTMCNC: 34.6 G/DL (ref 31–36)
MCV RBC AUTO: 89.3 FL (ref 80–100)
MICROSCOPIC EXAMINATION: YES
MONOCYTES ABSOLUTE: 1 K/UL (ref 0–1.3)
MONOCYTES RELATIVE PERCENT: 7.4 %
NEUTROPHILS ABSOLUTE: 11.8 K/UL (ref 1.7–7.7)
NEUTROPHILS RELATIVE PERCENT: 87.8 %
NITRITE, URINE: NEGATIVE
PDW BLD-RTO: 13 % (ref 12.4–15.4)
PH UA: 7 (ref 5–8)
PLATELET # BLD: 198 K/UL (ref 135–450)
PMV BLD AUTO: 6.8 FL (ref 5–10.5)
POTASSIUM SERPL-SCNC: 3.5 MMOL/L (ref 3.5–5.1)
PROTEIN UA: NEGATIVE MG/DL
RBC # BLD: 3.96 M/UL (ref 4–5.2)
RBC UA: ABNORMAL /HPF (ref 0–4)
SODIUM BLD-SCNC: 131 MMOL/L (ref 136–145)
SPECIFIC GRAVITY UA: <=1.005 (ref 1–1.03)
TOTAL PROTEIN: 7.6 G/DL (ref 6.4–8.2)
URINE TYPE: ABNORMAL
UROBILINOGEN, URINE: 0.2 E.U./DL
WBC # BLD: 13.5 K/UL (ref 4–11)
WBC UA: ABNORMAL /HPF (ref 0–5)

## 2021-06-07 PROCEDURE — 96376 TX/PRO/DX INJ SAME DRUG ADON: CPT

## 2021-06-07 PROCEDURE — 83690 ASSAY OF LIPASE: CPT

## 2021-06-07 PROCEDURE — 99285 EMERGENCY DEPT VISIT HI MDM: CPT

## 2021-06-07 PROCEDURE — 6370000000 HC RX 637 (ALT 250 FOR IP): Performed by: NURSE PRACTITIONER

## 2021-06-07 PROCEDURE — 81001 URINALYSIS AUTO W/SCOPE: CPT

## 2021-06-07 PROCEDURE — 96374 THER/PROPH/DIAG INJ IV PUSH: CPT

## 2021-06-07 PROCEDURE — 80053 COMPREHEN METABOLIC PANEL: CPT

## 2021-06-07 PROCEDURE — 74177 CT ABD & PELVIS W/CONTRAST: CPT

## 2021-06-07 PROCEDURE — 6360000002 HC RX W HCPCS

## 2021-06-07 PROCEDURE — 2580000003 HC RX 258: Performed by: EMERGENCY MEDICINE

## 2021-06-07 PROCEDURE — 6360000002 HC RX W HCPCS: Performed by: NURSE PRACTITIONER

## 2021-06-07 PROCEDURE — 85025 COMPLETE CBC W/AUTO DIFF WBC: CPT

## 2021-06-07 PROCEDURE — 6360000004 HC RX CONTRAST MEDICATION: Performed by: NURSE PRACTITIONER

## 2021-06-07 RX ORDER — DICYCLOMINE HYDROCHLORIDE 10 MG/1
10 CAPSULE ORAL EVERY 6 HOURS PRN
Qty: 20 CAPSULE | Refills: 0 | Status: SHIPPED | OUTPATIENT
Start: 2021-06-07

## 2021-06-07 RX ORDER — MORPHINE SULFATE 4 MG/ML
4 INJECTION, SOLUTION INTRAMUSCULAR; INTRAVENOUS EVERY 30 MIN PRN
Status: DISCONTINUED | OUTPATIENT
Start: 2021-06-07 | End: 2021-06-07 | Stop reason: HOSPADM

## 2021-06-07 RX ORDER — ONDANSETRON 2 MG/ML
4 INJECTION INTRAMUSCULAR; INTRAVENOUS EVERY 30 MIN PRN
Status: DISCONTINUED | OUTPATIENT
Start: 2021-06-07 | End: 2021-06-07 | Stop reason: HOSPADM

## 2021-06-07 RX ORDER — ACETAMINOPHEN 500 MG
1000 TABLET ORAL ONCE
Status: COMPLETED | OUTPATIENT
Start: 2021-06-07 | End: 2021-06-07

## 2021-06-07 RX ORDER — ONDANSETRON 2 MG/ML
INJECTION INTRAMUSCULAR; INTRAVENOUS
Status: COMPLETED
Start: 2021-06-07 | End: 2021-06-07

## 2021-06-07 RX ORDER — 0.9 % SODIUM CHLORIDE 0.9 %
1000 INTRAVENOUS SOLUTION INTRAVENOUS ONCE
Status: COMPLETED | OUTPATIENT
Start: 2021-06-07 | End: 2021-06-07

## 2021-06-07 RX ORDER — ONDANSETRON 2 MG/ML
4 INJECTION INTRAMUSCULAR; INTRAVENOUS ONCE
Status: COMPLETED | OUTPATIENT
Start: 2021-06-07 | End: 2021-06-07

## 2021-06-07 RX ORDER — ONDANSETRON 4 MG/1
4 TABLET, ORALLY DISINTEGRATING ORAL EVERY 8 HOURS PRN
Qty: 20 TABLET | Refills: 0 | Status: SHIPPED | OUTPATIENT
Start: 2021-06-07

## 2021-06-07 RX ADMIN — IOPAMIDOL 75 ML: 755 INJECTION, SOLUTION INTRAVENOUS at 08:41

## 2021-06-07 RX ADMIN — SODIUM CHLORIDE 1000 ML: 9 INJECTION, SOLUTION INTRAVENOUS at 07:43

## 2021-06-07 RX ADMIN — ONDANSETRON 4 MG: 2 INJECTION INTRAMUSCULAR; INTRAVENOUS at 07:43

## 2021-06-07 RX ADMIN — ACETAMINOPHEN 1000 MG: 500 TABLET ORAL at 11:14

## 2021-06-07 RX ADMIN — ONDANSETRON 4 MG: 2 INJECTION INTRAMUSCULAR; INTRAVENOUS at 09:35

## 2021-06-07 ASSESSMENT — ENCOUNTER SYMPTOMS
VOMITING: 1
WHEEZING: 0
COLOR CHANGE: 0
DIARRHEA: 0
COUGH: 0
SHORTNESS OF BREATH: 0
BACK PAIN: 0
ABDOMINAL PAIN: 1
NAUSEA: 1

## 2021-06-07 ASSESSMENT — PAIN SCALES - GENERAL
PAINLEVEL_OUTOF10: 8
PAINLEVEL_OUTOF10: 8

## 2021-06-07 NOTE — ED NOTES
Patient identified as a positive fall risk on the ED triage fall screening. Patient placed in fall precautions which includes:  yellow fall risk bracelet on wrist, yellow socks on feet, \"Be Safe\" sign placed on patient's door, and bed alarm placed under patient/alarm turned on. Patient instructed on importance of not getting out of bed or ambulating without assistance for safety.           Torie Montes De Oca RN  06/07/21 7469

## 2021-06-07 NOTE — ED PROVIDER NOTES
Procedure Laterality Date    CHOLECYSTECTOMY, LAPAROSCOPIC N/A 6/4/2021    LAPAROSCOPIC CHOLECYSTECTOMY WITH INTRAOPERATIVE CHOLANGIOGRAM performed by Marci Rai MD at [address] N/A 2008    TUNNELED VENOUS PORT PLACEMENT Right removal    PORT A CATH REMOVAL RIGHT CHEST              Medications:  Previous Medications    ACETAMINOPHEN (TYLENOL) 325 MG TABLET    Take 650 mg by mouth every 6 hours as needed for Pain    ATORVASTATIN (LIPITOR) 20 MG TABLET    Take 20 mg by mouth daily    CALCIUM POLYCARBOPHIL (FIBER-CAPS PO)    Take 2 capsules by mouth daily    DICLOFENAC (VOLTAREN) 0.1 % OPHTHALMIC SOLUTION    Place 2 drops into the left eye 4 times daily    FLUOXETINE (PROZAC) 20 MG CAPSULE    Take 10 mg by mouth daily     IBUPROFEN (ADVIL;MOTRIN) 200 MG TABLET    Take 200 mg by mouth every 6 hours as needed for Pain    LOSARTAN (COZAAR) 25 MG TABLET    Take 50 mg by mouth daily     MELATONIN 5 MG TBDP DISINTEGRATING TABLET    Take 5 mg by mouth nightly    MOXIFLOXACIN (VIGAMOX) 0.5 % OPHTHALMIC SOLUTION    Place 2 drops into the left eye 4 times daily     OXYCODONE (ROXICODONE) 5 MG IMMEDIATE RELEASE TABLET    Take 1-2 tablets by mouth every 6 hours as needed for Pain for up to 5 days. Intended supply: 5 days. Take lowest dose possible to manage pain    PANTOPRAZOLE (PROTONIX) 40 MG TABLET    Take 1 tablet by mouth daily    POLYETHYLENE GLYCOL (MIRALAX) 17 GM/SCOOP POWDER    Take 17 g by mouth daily    PREDNISOLONE ACETATE (PRED FORTE) 1 % OPHTHALMIC SUSPENSION    Place 2 drops into the left eye 4 times daily    PROPRANOLOL (INDERAL) 10 MG TABLET    Take 10 mg by mouth 2 times daily          Review of Systems:  (2-9 systems needed)  Review of Systems   Constitutional: Negative for chills and fever. HENT: Negative for congestion. Respiratory: Negative for cough, shortness of breath and wheezing. Cardiovascular: Negative for chest pain. Gastrointestinal: Positive for abdominal pain, nausea and vomiting. Negative for diarrhea. Patient complains of severe nausea, she had laparoscopic cholecystectomy on Friday with Dr. Dayan Mars, she is have lower abdominal cramping. She states she has severe nausea, has had extensive episodes of vomiting, she does report that she has been taking Gas-X and MiraLAX as she does have a history of constipation and bowel obstruction however she is complaining of lower abdominal cramping more in the left and right lower quadrants, not associated with her surgical laparoscopic surgical sites. Genitourinary: Negative for difficulty urinating, dysuria, frequency and hematuria. Musculoskeletal: Negative for back pain. Skin: Negative for color change. Neurological: Negative for weakness, numbness and headaches. Physical Exam:  Physical Exam  Vitals and nursing note reviewed. Constitutional:       Appearance: She is well-developed. She is not diaphoretic. HENT:      Head: Normocephalic. Right Ear: External ear normal.      Left Ear: External ear normal.   Eyes:      General: No scleral icterus. Right eye: No discharge. Left eye: No discharge. Cardiovascular:      Rate and Rhythm: Normal rate and regular rhythm. Pulmonary:      Effort: Pulmonary effort is normal. No respiratory distress. Breath sounds: Normal breath sounds. Abdominal:      Palpations: Abdomen is soft. Tenderness: There is no abdominal tenderness. Comments: Abdomen is flat soft and nondistended. Bowel sounds are hypoactive, she has no specific tenderness guarding or rebound tenderness, no ascites or rigidity. Abdominal surgical incision sites are well approximated, no surrounding erythema edema or wound dehiscence. Musculoskeletal:         General: Normal range of motion. Cervical back: Normal range of motion and neck supple. Skin:     General: Skin is warm.       Capillary Refill: Capillary refill takes less than 2 seconds. Coloration: Skin is not pale. Neurological:      General: No focal deficit present. Mental Status: She is alert and oriented to person, place, and time. GCS: GCS eye subscore is 4. GCS verbal subscore is 5. GCS motor subscore is 6.    Psychiatric:         Behavior: Behavior normal.         MEDICAL DECISION MAKING    Vitals:    Vitals:    06/07/21 0727 06/07/21 0818 06/07/21 0934   BP: (!) 154/85 (!) 146/79 (!) 164/83   Pulse: 93 89 92   Resp: 20 16 16   Temp: 99.9 °F (37.7 °C)     TempSrc: Oral     SpO2: 98% 96% 94%   Weight: 142 lb (64.4 kg)         LABS:  Labs Reviewed   CBC WITH AUTO DIFFERENTIAL - Abnormal; Notable for the following components:       Result Value    WBC 13.5 (*)     RBC 3.96 (*)     Hematocrit 35.3 (*)     Neutrophils Absolute 11.8 (*)     Lymphocytes Absolute 0.6 (*)     All other components within normal limits    Narrative:     Performed at:  [address] [place] [address] [employer]   Phone [phone]   COMPREHENSIVE METABOLIC PANEL - Abnormal; Notable for the following components:    Sodium 131 (*)     Chloride 93 (*)     Glucose 115 (*)     All other components within normal limits    Narrative:     Performed at:  [address] [place] [address] [employer]   Phone [phone]   URINALYSIS - Abnormal; Notable for the following components:    Ketones, Urine 40 (*)     Blood, Urine SMALL (*)     All other components within normal limits    Narrative:     Performed at:  [address] [place] [address] [employer]   Phone [phone]   MICROSCOPIC URINALYSIS - Abnormal; Notable for the following components:    RBC, UA 5-10 (*)     Epithelial Cells, UA 11-20 (*)     Bacteria, UA 1+ (*)     All other components within normal limits    Narrative:     Performed at:  [facility] [address]  [place], [address]   Phone [phone]   LIPASE    Narrative:     Performed at:  [facility]  [address]  [place], [address] [employer]   Phone  of labs reviewed and were negative at this time or not returned at the time of this note. RADIOLOGY:   Non-plain film images such as CT, Ultrasound and MRI are read by the radiologist. Lara WILLINGHAM APRN - CNP have directly visualized the radiologic plain film image(s) with the below findings:      Interpretation per the Radiologist below, if available at the time of this note:    CT ABDOMEN PELVIS W IV CONTRAST Additional Contrast? None   Final Result   1. Mild bibasilar segmental atelectasis versus pneumonia. 2. Ileus versus enterocolitis. MEDICAL DECISION MAKING / ED COURSE:    Because of high probability of sudden clinical deterioration of the patient's condition and risk of further deterioration, critical care time required my full attention to the patient's condition; which included chart data review, documentation, medication ordering, reviewing the patient's old records, reevaluation patient's cardiac, pulmonary and neurological status. Reevaluation of vital signs. Consultations with ED attending and admitting physician. Ordering, interpreting reviewing diagnostic testing. Therefore a critical care time was 22 minutes of direct attention to the patient's condition did not include time spent on procedures.     PROCEDURES:   Procedures    None    Patient was given:  Medications   morphine (PF) injection 4 mg (has no administration in time range)   ondansetron (ZOFRAN) injection 4 mg (4 mg Intravenous Given 6/7/21 0935)   ondansetron (ZOFRAN) injection 4 mg (4 mg Intravenous Given 6/7/21 0743)   0.9 % sodium chloride bolus (1,000 mLs Intravenous New Bag 6/7/21 0743)   iopamidol (ISOVUE-370) 76 % injection 75 mL (75 mLs Intravenous Given 6/7/21 0841) physician was available for consultation as needed. The patient and / or the family were informed of the results of any tests, a time was given to answer questions, a plan was proposed and they agreed with plan. Addendum: at 21  staff informing that the patient developed a fever of 101 degrees, I then ordered her Tylenol and paged general surgery via perfect serve. At 1220 I spoke with Dr. Echo Bonner in regards to the patient developing a fever, her temperature has improved since receiving antipyretics, he informs patient can still be discharged home with outpatient follow-up in his office, states he will see her this week. Both patient and family verbalized understanding of discharge instructions and the patient was discharged from the department in stable condition. CLINICAL IMPRESSION:  1. Status post cholecystectomy    2. Postoperative nausea and vomiting    3.  Ileus, postoperative ([address]        DISPOSITION        PATIENT REFERRED TO:  GARY Fisher CNP    Schedule an appointment as soon as possible for a visit   As needed    Ellen Kaufman MD  [address] [place] [address], [address] [place] [address]  [phone]    Schedule an appointment as soon as possible for a visit in 2 days  Follow-up with your surgeon in the next 3 to 4 days for reevaluation    [facility]  ED  [facility] [address] [phone]  Go to   If symptoms worsen      DISCHARGE MEDICATIONS:  New Prescriptions    DICYCLOMINE (BENTYL) 10 MG CAPSULE    Take 1 capsule by mouth every 6 hours as needed (cramps)    ONDANSETRON (ZOFRAN ODT) 4 MG DISINTEGRATING TABLET    Take 1 tablet by mouth every 8 hours as needed for Nausea       DISCONTINUED MEDICATIONS:  Discontinued Medications    No medications on file              (Please note the MDM and HPI sections of this note were completed with a voice recognition program.  Efforts were made to edit the dictations but occasionally words are mis-transcribed.)    Electronically signed, GARY Nichols CNP,          GARY Nichols CNP  06/07/21 1028       GARY Birch CNP  06/07/21 1222

## 2021-06-07 NOTE — ED NOTES
Verbal and written discharge instructions given. IV and telemetry monitor removed. Prescriptions given to patient. Patient ambulatory at time of discharge. Patient in stable condition, discharged home with .       Cinthia Turpin RN  06/07/21 0548

## 2021-06-07 NOTE — ED NOTES
Called Gen Surg @ 3496  Re: Consult status post cholecystectomy, question of ileus versus colitis  Dr Lobo Rosales responded @ [address]  06/07/21 7672

## 2021-06-14 ENCOUNTER — OFFICE VISIT (OUTPATIENT)
Dept: SURGERY | Age: 71
End: 2021-06-14

## 2021-06-14 VITALS
BODY MASS INDEX: 25.16 KG/M2 | HEIGHT: 63 IN | HEART RATE: 73 BPM | WEIGHT: 142 LBS | SYSTOLIC BLOOD PRESSURE: 117 MMHG | DIASTOLIC BLOOD PRESSURE: 69 MMHG

## 2021-06-14 DIAGNOSIS — Z09 POSTOP CHECK: Primary | ICD-10-CM

## 2021-06-14 PROCEDURE — 99024 POSTOP FOLLOW-UP VISIT: CPT | Performed by: SURGERY

## 2021-06-16 NOTE — PROGRESS NOTES
HPI: Nursing notes reviewed. Patient reports improved pain. No nausea. No fevers. ROS:  10 point review of systems performed with pertinent positives in HPI    Phys:    Abd - soft, minimal tender, nondistended   Incisions - no erythema    Assesment: 70 yo s/p lap radha    Plan: 1. Doing well postop    2. No activity restrictions   3.   Call with concerns

## 2022-06-01 ENCOUNTER — HOSPITAL ENCOUNTER (OUTPATIENT)
Dept: MAMMOGRAPHY | Age: 72
Discharge: HOME | End: 2022-06-01
Payer: MEDICARE

## 2022-06-01 ENCOUNTER — TELEPHONE (OUTPATIENT)
Dept: MAMMOGRAPHY | Age: 72
End: 2022-06-01

## 2022-06-01 DIAGNOSIS — Z12.31 SCREENING MAMMOGRAM, ENCOUNTER FOR: ICD-10-CM

## 2022-06-01 PROCEDURE — 77063 BREAST TOMOSYNTHESIS BI: CPT

## 2023-03-22 NOTE — PROGRESS NOTES
Bedside Mobility Assessment Tool (BMAT):     Assessment Level 1- Sit and Shake    1. From a semi-reclined position, ask patient to sit up and rotate to a seated position at the side of the bed. Can use the bedrail. 2. Ask patient to reach out and grab your hand and shake making sure patient reaches across his/her midline. Pass- Patient is able to come to a seated position, maintain core strength. Maintains seated balance while reaching across midline. Move on to Assessment Level 2. Assessment Level 2- Stretch and Point   1. With patient in seated position at the side of the bed, have patient place both feet on the floor (or stool) with knees no higher than hips. 2. Ask patient to stretch one leg and straighten the knee, then bend the ankle/flex and point the toes. If appropriate, repeat with the other leg. Pass- Patient is able to demonstrate appropriate quad strength on intended weight bearing limb(s). Move onto Assessment Level 3. Assessment Level 3- Stand   1. Ask patient to elevate off the bed or chair (seated to standing) using an assistive device (cane, bedrail). 2. Patient should be able to raise buttocks off be and hold for a count of five. May repeat once. Pass- Patient maintains standing stability for at least 5 seconds, proceed to assessment level 4. Assessment Level 4- Walk   1. Ask patient to march in place at bedside. 2. Then ask patient to advance step and return each foot. Some medical conditions may render a patient from stepping backwards, use your best clinical judgement. Pass- Patient demonstrates balance while shifting weight and ability to step, takes independent steps, does not use assistive device patient is MOBILITY LEVEL 4.       Mobility Level- 4 Patient has been on Paxil for about a week and half now and states she feels like its not working. Wondering what she should do next.

## 2023-07-16 ENCOUNTER — HOSPITAL ENCOUNTER (EMERGENCY)
Age: 73
Discharge: HOME | End: 2023-07-16
Attending: STUDENT IN AN ORGANIZED HEALTH CARE EDUCATION/TRAINING PROGRAM
Payer: MEDICARE

## 2023-07-16 VITALS
SYSTOLIC BLOOD PRESSURE: 150 MMHG | HEIGHT: 61 IN | WEIGHT: 124 LBS | BODY MASS INDEX: 23.41 KG/M2 | DIASTOLIC BLOOD PRESSURE: 77 MMHG | HEART RATE: 78 BPM | OXYGEN SATURATION: 99 % | TEMPERATURE: 98.4 F | RESPIRATION RATE: 18 BRPM

## 2023-07-16 DIAGNOSIS — T63.451A HORNET STING, ACCIDENTAL OR UNINTENTIONAL, INITIAL ENCOUNTER: ICD-10-CM

## 2023-07-16 DIAGNOSIS — L50.9 URTICARIA: Primary | ICD-10-CM

## 2023-07-16 PROCEDURE — 6370000000 HC RX 637 (ALT 250 FOR IP): Performed by: STUDENT IN AN ORGANIZED HEALTH CARE EDUCATION/TRAINING PROGRAM

## 2023-07-16 PROCEDURE — 6360000002 HC RX W HCPCS: Performed by: STUDENT IN AN ORGANIZED HEALTH CARE EDUCATION/TRAINING PROGRAM

## 2023-07-16 PROCEDURE — 90715 TDAP VACCINE 7 YRS/> IM: CPT | Performed by: STUDENT IN AN ORGANIZED HEALTH CARE EDUCATION/TRAINING PROGRAM

## 2023-07-16 PROCEDURE — 90471 IMMUNIZATION ADMIN: CPT | Performed by: STUDENT IN AN ORGANIZED HEALTH CARE EDUCATION/TRAINING PROGRAM

## 2023-07-16 PROCEDURE — 99284 EMERGENCY DEPT VISIT MOD MDM: CPT

## 2023-07-16 RX ORDER — PREDNISONE 20 MG/1
40 TABLET ORAL DAILY
Qty: 10 TABLET | Refills: 0 | Status: SHIPPED | OUTPATIENT
Start: 2023-07-16 | End: 2023-07-21

## 2023-07-16 RX ORDER — FAMOTIDINE 20 MG/1
20 TABLET, FILM COATED ORAL 2 TIMES DAILY PRN
Qty: 10 TABLET | Refills: 0 | Status: SHIPPED | OUTPATIENT
Start: 2023-07-16 | End: 2023-07-21

## 2023-07-16 RX ORDER — PREDNISONE 20 MG/1
60 TABLET ORAL ONCE
Status: COMPLETED | OUTPATIENT
Start: 2023-07-16 | End: 2023-07-16

## 2023-07-16 RX ORDER — FAMOTIDINE 20 MG/1
40 TABLET, FILM COATED ORAL ONCE
Status: COMPLETED | OUTPATIENT
Start: 2023-07-16 | End: 2023-07-16

## 2023-07-16 RX ORDER — DIPHENHYDRAMINE HCL 25 MG
25 TABLET ORAL ONCE
Status: COMPLETED | OUTPATIENT
Start: 2023-07-16 | End: 2023-07-16

## 2023-07-16 RX ADMIN — FAMOTIDINE 40 MG: 20 TABLET ORAL at 01:10

## 2023-07-16 RX ADMIN — TETANUS TOXOID, REDUCED DIPHTHERIA TOXOID AND ACELLULAR PERTUSSIS VACCINE, ADSORBED 0.5 ML: 5; 2.5; 8; 8; 2.5 SUSPENSION INTRAMUSCULAR at 01:11

## 2023-07-16 RX ADMIN — DIPHENHYDRAMINE HCL 25 MG: 25 TABLET ORAL at 01:10

## 2023-07-16 RX ADMIN — PREDNISONE 60 MG: 20 TABLET ORAL at 01:10

## 2023-07-16 ASSESSMENT — PAIN - FUNCTIONAL ASSESSMENT: PAIN_FUNCTIONAL_ASSESSMENT: NONE - DENIES PAIN

## 2023-12-03 ENCOUNTER — HOSPITAL ENCOUNTER (EMERGENCY)
Age: 73
Discharge: ANOTHER HOSPITAL | End: 2023-12-03
Attending: STUDENT IN AN ORGANIZED HEALTH CARE EDUCATION/TRAINING PROGRAM
Payer: COMMERCIAL

## 2023-12-03 ENCOUNTER — APPOINTMENT (OUTPATIENT)
Dept: CT IMAGING | Age: 73
End: 2023-12-03
Payer: COMMERCIAL

## 2023-12-03 ENCOUNTER — APPOINTMENT (OUTPATIENT)
Dept: GENERAL RADIOLOGY | Age: 73
End: 2023-12-03
Payer: COMMERCIAL

## 2023-12-03 VITALS
HEART RATE: 87 BPM | HEIGHT: 61 IN | WEIGHT: 114 LBS | SYSTOLIC BLOOD PRESSURE: 153 MMHG | TEMPERATURE: 97.6 F | BODY MASS INDEX: 21.52 KG/M2 | OXYGEN SATURATION: 97 % | DIASTOLIC BLOOD PRESSURE: 101 MMHG | RESPIRATION RATE: 16 BRPM

## 2023-12-03 DIAGNOSIS — M19.90 OSTEOARTHRITIS, UNSPECIFIED OSTEOARTHRITIS TYPE, UNSPECIFIED SITE: ICD-10-CM

## 2023-12-03 DIAGNOSIS — E87.6 HYPOKALEMIA: ICD-10-CM

## 2023-12-03 DIAGNOSIS — Z85.9 HISTORY OF CANCER: ICD-10-CM

## 2023-12-03 DIAGNOSIS — K56.609 SBO (SMALL BOWEL OBSTRUCTION) (HCC): Primary | ICD-10-CM

## 2023-12-03 DIAGNOSIS — E83.42 HYPOMAGNESEMIA: ICD-10-CM

## 2023-12-03 DIAGNOSIS — K44.9 HIATAL HERNIA: ICD-10-CM

## 2023-12-03 DIAGNOSIS — R93.5 ABNORMAL CT OF THE ABDOMEN: ICD-10-CM

## 2023-12-03 LAB
ALBUMIN SERPL-MCNC: 4.1 G/DL (ref 3.4–5)
ALBUMIN/GLOB SERPL: 2.2 {RATIO} (ref 1.1–2.2)
ALP SERPL-CCNC: 50 U/L (ref 40–129)
ALT SERPL-CCNC: 19 U/L (ref 10–40)
ANION GAP SERPL CALCULATED.3IONS-SCNC: 14 MMOL/L (ref 3–16)
AST SERPL-CCNC: 39 U/L (ref 15–37)
BASOPHILS # BLD: 0 K/UL (ref 0–0.2)
BASOPHILS NFR BLD: 0.4 %
BILIRUB SERPL-MCNC: <0.2 MG/DL (ref 0–1)
BUN SERPL-MCNC: 10 MG/DL (ref 7–20)
CALCIUM SERPL-MCNC: 9.5 MG/DL (ref 8.3–10.6)
CHLORIDE SERPL-SCNC: 99 MMOL/L (ref 99–110)
CO2 SERPL-SCNC: 23 MMOL/L (ref 21–32)
CREAT SERPL-MCNC: 0.6 MG/DL (ref 0.6–1.2)
DEPRECATED RDW RBC AUTO: 13.5 % (ref 12.4–15.4)
EKG ATRIAL RATE: 41 BPM
EKG DIAGNOSIS: NORMAL
EKG Q-T INTERVAL: 482 MS
EKG QRS DURATION: 88 MS
EKG QTC CALCULATION (BAZETT): 439 MS
EKG R AXIS: 21 DEGREES
EKG T AXIS: 49 DEGREES
EKG VENTRICULAR RATE: 50 BPM
EOSINOPHIL # BLD: 0 K/UL (ref 0–0.6)
EOSINOPHIL NFR BLD: 0.7 %
GFR SERPLBLD CREATININE-BSD FMLA CKD-EPI: >60 ML/MIN/{1.73_M2}
GLUCOSE SERPL-MCNC: 127 MG/DL (ref 70–99)
HCT VFR BLD AUTO: 36.1 % (ref 36–48)
HGB BLD-MCNC: 12.4 G/DL (ref 12–16)
LACTATE BLDV-SCNC: 1.9 MMOL/L (ref 0.4–1.9)
LACTATE BLDV-SCNC: 2.1 MMOL/L (ref 0.4–1.9)
LIPASE SERPL-CCNC: 30 U/L (ref 13–60)
LYMPHOCYTES # BLD: 1.8 K/UL (ref 1–5.1)
LYMPHOCYTES NFR BLD: 30.5 %
MAGNESIUM SERPL-MCNC: 1.6 MG/DL (ref 1.8–2.4)
MCH RBC QN AUTO: 30.5 PG (ref 26–34)
MCHC RBC AUTO-ENTMCNC: 34.3 G/DL (ref 31–36)
MCV RBC AUTO: 89 FL (ref 80–100)
MONOCYTES # BLD: 0.5 K/UL (ref 0–1.3)
MONOCYTES NFR BLD: 8.7 %
NEUTROPHILS # BLD: 3.6 K/UL (ref 1.7–7.7)
NEUTROPHILS NFR BLD: 59.7 %
NT-PROBNP SERPL-MCNC: 67 PG/ML (ref 0–449)
PLATELET # BLD AUTO: 218 K/UL (ref 135–450)
PMV BLD AUTO: 7.1 FL (ref 5–10.5)
POTASSIUM SERPL-SCNC: 3.1 MMOL/L (ref 3.5–5.1)
PROCALCITONIN SERPL IA-MCNC: 0.03 NG/ML (ref 0–0.15)
PROT SERPL-MCNC: 6 G/DL (ref 6.4–8.2)
RBC # BLD AUTO: 4.06 M/UL (ref 4–5.2)
SODIUM SERPL-SCNC: 136 MMOL/L (ref 136–145)
TROPONIN, HIGH SENSITIVITY: 7 NG/L (ref 0–14)
TROPONIN, HIGH SENSITIVITY: 8 NG/L (ref 0–14)
WBC # BLD AUTO: 6 K/UL (ref 4–11)

## 2023-12-03 PROCEDURE — 85025 COMPLETE CBC W/AUTO DIFF WBC: CPT

## 2023-12-03 PROCEDURE — 83880 ASSAY OF NATRIURETIC PEPTIDE: CPT

## 2023-12-03 PROCEDURE — 99285 EMERGENCY DEPT VISIT HI MDM: CPT

## 2023-12-03 PROCEDURE — 36415 COLL VENOUS BLD VENIPUNCTURE: CPT

## 2023-12-03 PROCEDURE — 2500000003 HC RX 250 WO HCPCS: Performed by: NURSE PRACTITIONER

## 2023-12-03 PROCEDURE — 71046 X-RAY EXAM CHEST 2 VIEWS: CPT

## 2023-12-03 PROCEDURE — 96372 THER/PROPH/DIAG INJ SC/IM: CPT

## 2023-12-03 PROCEDURE — 74177 CT ABD & PELVIS W/CONTRAST: CPT

## 2023-12-03 PROCEDURE — 83690 ASSAY OF LIPASE: CPT

## 2023-12-03 PROCEDURE — 6360000004 HC RX CONTRAST MEDICATION: Performed by: NURSE PRACTITIONER

## 2023-12-03 PROCEDURE — 96365 THER/PROPH/DIAG IV INF INIT: CPT

## 2023-12-03 PROCEDURE — 93005 ELECTROCARDIOGRAM TRACING: CPT | Performed by: NURSE PRACTITIONER

## 2023-12-03 PROCEDURE — 96366 THER/PROPH/DIAG IV INF ADDON: CPT

## 2023-12-03 PROCEDURE — 84484 ASSAY OF TROPONIN QUANT: CPT

## 2023-12-03 PROCEDURE — 84145 PROCALCITONIN (PCT): CPT

## 2023-12-03 PROCEDURE — A4216 STERILE WATER/SALINE, 10 ML: HCPCS | Performed by: NURSE PRACTITIONER

## 2023-12-03 PROCEDURE — 6360000002 HC RX W HCPCS: Performed by: NURSE PRACTITIONER

## 2023-12-03 PROCEDURE — 83605 ASSAY OF LACTIC ACID: CPT

## 2023-12-03 PROCEDURE — 96368 THER/DIAG CONCURRENT INF: CPT

## 2023-12-03 PROCEDURE — 74018 RADEX ABDOMEN 1 VIEW: CPT

## 2023-12-03 PROCEDURE — 96376 TX/PRO/DX INJ SAME DRUG ADON: CPT

## 2023-12-03 PROCEDURE — 96375 TX/PRO/DX INJ NEW DRUG ADDON: CPT

## 2023-12-03 PROCEDURE — 83735 ASSAY OF MAGNESIUM: CPT

## 2023-12-03 PROCEDURE — 80053 COMPREHEN METABOLIC PANEL: CPT

## 2023-12-03 PROCEDURE — 6370000000 HC RX 637 (ALT 250 FOR IP): Performed by: NURSE PRACTITIONER

## 2023-12-03 PROCEDURE — 93010 ELECTROCARDIOGRAM REPORT: CPT | Performed by: INTERNAL MEDICINE

## 2023-12-03 PROCEDURE — 2580000003 HC RX 258: Performed by: NURSE PRACTITIONER

## 2023-12-03 RX ORDER — POTASSIUM CHLORIDE 7.45 MG/ML
10 INJECTION INTRAVENOUS
Status: ACTIVE | OUTPATIENT
Start: 2023-12-03 | End: 2023-12-03

## 2023-12-03 RX ORDER — PROMETHAZINE HYDROCHLORIDE 25 MG/ML
12.5 INJECTION, SOLUTION INTRAMUSCULAR; INTRAVENOUS ONCE
Status: COMPLETED | OUTPATIENT
Start: 2023-12-03 | End: 2023-12-03

## 2023-12-03 RX ORDER — ONDANSETRON 2 MG/ML
4 INJECTION INTRAMUSCULAR; INTRAVENOUS ONCE
Status: COMPLETED | OUTPATIENT
Start: 2023-12-03 | End: 2023-12-03

## 2023-12-03 RX ORDER — MAGNESIUM SULFATE IN WATER 40 MG/ML
2000 INJECTION, SOLUTION INTRAVENOUS ONCE
Status: COMPLETED | OUTPATIENT
Start: 2023-12-03 | End: 2023-12-03

## 2023-12-03 RX ORDER — MORPHINE SULFATE 4 MG/ML
4 INJECTION, SOLUTION INTRAMUSCULAR; INTRAVENOUS ONCE
Status: COMPLETED | OUTPATIENT
Start: 2023-12-03 | End: 2023-12-03

## 2023-12-03 RX ORDER — POTASSIUM CHLORIDE 7.45 MG/ML
10 INJECTION INTRAVENOUS
Status: DISPENSED | OUTPATIENT
Start: 2023-12-03 | End: 2023-12-03

## 2023-12-03 RX ADMIN — ONDANSETRON 4 MG: 2 INJECTION INTRAMUSCULAR; INTRAVENOUS at 18:13

## 2023-12-03 RX ADMIN — POTASSIUM CHLORIDE 10 MEQ: 7.46 INJECTION, SOLUTION INTRAVENOUS at 14:46

## 2023-12-03 RX ADMIN — HYDROMORPHONE HYDROCHLORIDE 0.5 MG: 1 INJECTION, SOLUTION INTRAMUSCULAR; INTRAVENOUS; SUBCUTANEOUS at 18:14

## 2023-12-03 RX ADMIN — MAGNESIUM SULFATE HEPTAHYDRATE 2000 MG: 40 INJECTION, SOLUTION INTRAVENOUS at 14:43

## 2023-12-03 RX ADMIN — ONDANSETRON 4 MG: 2 INJECTION INTRAMUSCULAR; INTRAVENOUS at 11:50

## 2023-12-03 RX ADMIN — MORPHINE SULFATE 4 MG: 4 INJECTION, SOLUTION INTRAMUSCULAR; INTRAVENOUS at 11:49

## 2023-12-03 RX ADMIN — MORPHINE SULFATE 4 MG: 4 INJECTION, SOLUTION INTRAMUSCULAR; INTRAVENOUS at 12:25

## 2023-12-03 RX ADMIN — IOPAMIDOL 75 ML: 755 INJECTION, SOLUTION INTRAVENOUS at 14:10

## 2023-12-03 RX ADMIN — Medication: at 12:16

## 2023-12-03 RX ADMIN — FAMOTIDINE 20 MG: 10 INJECTION, SOLUTION INTRAVENOUS at 11:49

## 2023-12-03 RX ADMIN — ONDANSETRON 4 MG: 2 INJECTION INTRAMUSCULAR; INTRAVENOUS at 15:42

## 2023-12-03 RX ADMIN — HYDROMORPHONE HYDROCHLORIDE 0.5 MG: 1 INJECTION, SOLUTION INTRAMUSCULAR; INTRAVENOUS; SUBCUTANEOUS at 14:54

## 2023-12-03 RX ADMIN — PROMETHAZINE HYDROCHLORIDE 12.5 MG: 25 INJECTION INTRAMUSCULAR; INTRAVENOUS at 12:58

## 2023-12-03 ASSESSMENT — ENCOUNTER SYMPTOMS
ABDOMINAL PAIN: 1
RHINORRHEA: 0
FACIAL SWELLING: 0
NAUSEA: 1
SHORTNESS OF BREATH: 0
SORE THROAT: 0
VOMITING: 1
COLOR CHANGE: 0

## 2023-12-03 ASSESSMENT — PAIN - FUNCTIONAL ASSESSMENT: PAIN_FUNCTIONAL_ASSESSMENT: 0-10

## 2023-12-03 ASSESSMENT — PAIN SCALES - GENERAL: PAINLEVEL_OUTOF10: 10

## 2023-12-03 ASSESSMENT — PAIN DESCRIPTION - LOCATION: LOCATION: ABDOMEN

## 2023-12-03 NOTE — ED NOTES
1510- Call to general surgery with consult. Dr. Bryant Baca to callback. 18- Dr. Bryant Baca with callback.       Caren Prader, RN  12/03/23 5295

## 2023-12-03 NOTE — ED NOTES
Patient care report from [facility], [address], [place]  12/03/23 0400

## 2023-12-03 NOTE — ED PROVIDER NOTES
I independently performed a history and physical on [employer]. I have completed a substantive portion of the visit including all aspects of the medical decision making. All diagnostic, treatment, and disposition decisions were made by myself in conjunction with the advanced practice provider/resident. In summary the patient presents with abdominal pain. On my evaluation she is afebrile hemodynamically stable with persistent diffuse abdominal pain. Her breath sounds are clear abdomen is tender and guarded. Her extremities are warm and well-perfused. Her workup is concerning for a bowel obstruction and given this we will provide NG decompression and transfer for admission for evaluation by surgery. I independently reviewed the ECG as follows:    Sinus bradycardia at a rate of 50 with junctional escape beats. Normal axis and intervals. No evidence of acute ischemia. For further details of [facility] emergency department encounter, please see the DIRK/resident's documentation.       MD Gisele Dougherty MD  12/03/23 5418
stone within the superior   left kidney. No hydronephrosis. Otherwise unremarkable. GI/Bowel:   Small hiatal hernia. Suspected high-grade small bowel   obstruction with concern for closed loop morphology in the pelvis and   transition point in the right pelvis (series 2/sample images 118-124). Extensive associated mesenteric edema. No pneumatosis, free intraperitoneal   air or mesenteric venous gas identified. Colonic diverticulosis without   acute diverticulitis. No acute appendicitis. Pelvis:   Hysterectomy. Free fluid. Otherwise unremarkable. Peritoneum/Retroperitoneum:   Small to moderate free fluid. Lower   abdominal/pelvic mesenteric edema. Ovoid 1.2 x 1.4 cm lesion in the left   abdomen demonstrates avid homogeneous enhancement (series 2/sample image 70),   not definitively seen on the prior study. No drainable fluid collection   identified. Bones/Soft Tissues:   Severe multilevel disc disease. Large intraosseous   vertebral hemangioma within the T12 vertebral body, without pathologic   fracture. No acute fracture. Interpretation per the Radiologist below, if available at the time of this note:    XR ABDOMEN FOR NG/OG/NE TUBE PLACEMENT   Final Result   Side hole of the NG tube is near the GE junction. CT ABDOMEN PELVIS W IV CONTRAST Additional Contrast? None   Final Result   1. High-grade small bowel obstruction with suspected closed loop morphology. No free air. Surgical consultation recommended. 2.  Ovoid hyperattenuating left abdominal mesenteric structure which may   represent a pseudoaneurysm or avidly enhancing neoplasm, not appreciated on   the prior. 3.  Additional nonemergent findings, as above. Critical findings personally discussed with Avani Garcia NP at 2:45 p.m.   on 12/03/2023. XR CHEST (2 VW)   Final Result   No acute cardiopulmonary disease. No results found. No results found.   No results

## 2023-12-03 NOTE — ED NOTES
Patient requesting additional pain medication, provider notified.      Rawleigh Brittle, RN  12/03/23 7956

## 2023-12-04 NOTE — ED NOTES
Patient care report to [facility], patient out of ER in stable condition.      Sharda Schuster RN  12/03/23 6034

## 2024-01-23 ENCOUNTER — HOSPITAL ENCOUNTER (EMERGENCY)
Age: 74
Discharge: LEFT BEFORE DISCHARGE | End: 2024-01-23

## 2024-07-15 ENCOUNTER — HOSPITAL ENCOUNTER (EMERGENCY)
Age: 74
Discharge: HOME | End: 2024-07-15
Attending: STUDENT IN AN ORGANIZED HEALTH CARE EDUCATION/TRAINING PROGRAM
Payer: MEDICARE

## 2024-07-15 VITALS
HEIGHT: 61 IN | SYSTOLIC BLOOD PRESSURE: 138 MMHG | WEIGHT: 118 LBS | TEMPERATURE: 98.6 F | HEART RATE: 60 BPM | OXYGEN SATURATION: 98 % | DIASTOLIC BLOOD PRESSURE: 81 MMHG | BODY MASS INDEX: 22.28 KG/M2 | RESPIRATION RATE: 16 BRPM

## 2024-07-15 DIAGNOSIS — S05.01XA ABRASION OF RIGHT CORNEA, INITIAL ENCOUNTER: Primary | ICD-10-CM

## 2024-07-15 PROCEDURE — 99284 EMERGENCY DEPT VISIT MOD MDM: CPT

## 2024-07-15 RX ORDER — KETOROLAC TROMETHAMINE 5 MG/ML
1 SOLUTION OPHTHALMIC 4 TIMES DAILY
Qty: 10 ML | Refills: 0 | Status: SHIPPED | OUTPATIENT
Start: 2024-07-15 | End: 2024-07-15

## 2024-07-15 RX ORDER — CIPROFLOXACIN HYDROCHLORIDE 3.5 MG/ML
1 SOLUTION/ DROPS TOPICAL ONCE
Status: DISCONTINUED | OUTPATIENT
Start: 2024-07-15 | End: 2024-07-15

## 2024-07-15 RX ORDER — CIPROFLOXACIN HYDROCHLORIDE 3.5 MG/ML
1 SOLUTION/ DROPS TOPICAL
Qty: 10 ML | Refills: 0 | Status: SHIPPED | OUTPATIENT
Start: 2024-07-15 | End: 2024-08-01

## 2024-07-15 RX ORDER — MIRTAZAPINE 7.5 MG/1
7.5 TABLET, FILM COATED ORAL NIGHTLY
COMMUNITY
Start: 2024-04-08

## 2024-07-15 RX ORDER — KETOROLAC TROMETHAMINE 5 MG/ML
1 SOLUTION OPHTHALMIC 4 TIMES DAILY
Qty: 10 ML | Refills: 0 | Status: SHIPPED | OUTPATIENT
Start: 2024-07-15 | End: 2024-09-03

## 2024-07-15 RX ORDER — CIPROFLOXACIN HYDROCHLORIDE 3.5 MG/ML
1 SOLUTION/ DROPS TOPICAL
Qty: 10 ML | Refills: 0 | Status: SHIPPED | OUTPATIENT
Start: 2024-07-15 | End: 2024-07-15

## 2024-07-15 ASSESSMENT — LIFESTYLE VARIABLES
HOW OFTEN DO YOU HAVE A DRINK CONTAINING ALCOHOL: NEVER
HOW MANY STANDARD DRINKS CONTAINING ALCOHOL DO YOU HAVE ON A TYPICAL DAY: PATIENT DOES NOT DRINK

## 2024-07-15 ASSESSMENT — ENCOUNTER SYMPTOMS: EYE PAIN: 1

## 2024-07-15 ASSESSMENT — PAIN - FUNCTIONAL ASSESSMENT
PAIN_FUNCTIONAL_ASSESSMENT: PREVENTS OR INTERFERES WITH MANY ACTIVE NOT PASSIVE ACTIVITIES
PAIN_FUNCTIONAL_ASSESSMENT: 0-10

## 2024-07-15 ASSESSMENT — PAIN SCALES - GENERAL: PAINLEVEL_OUTOF10: 5

## 2024-07-15 ASSESSMENT — PAIN DESCRIPTION - ONSET: ONSET: SUDDEN

## 2024-07-15 ASSESSMENT — PAIN DESCRIPTION - DESCRIPTORS: DESCRIPTORS: PATIENT UNABLE TO DESCRIBE

## 2024-07-15 ASSESSMENT — PAIN DESCRIPTION - PAIN TYPE: TYPE: ACUTE PAIN

## 2024-07-15 ASSESSMENT — PAIN DESCRIPTION - FREQUENCY: FREQUENCY: CONTINUOUS

## 2024-07-15 ASSESSMENT — PAIN DESCRIPTION - LOCATION: LOCATION: EYE

## 2024-07-15 NOTE — DISCHARGE INSTRUCTIONS
Please return to the emergency department if you develop any new, ongoing or worsening symptoms.  We hope you feel better soon! Please call the number provided to follow up with an ophthalmologist in the next 3 days

## 2024-07-15 NOTE — ED PROVIDER NOTES
Emergency Department Provider Note  Location: [facility] EMERGENCY DEPARTMENT  7/15/2024     Patient Identification  Vivienne Colvin is a 73 y.o. female    Chief Complaint  Eye Injury      Mode of Arrival  private car    HPI  (History provided by patient)  This is a 73 y.o. female with a PMH significant for cancer, HLD, HTN  presented today for right eye injury.  Patient reports that she accidentally poked her right eye with a straw earlier today.  She states that pain is progressed.  She denies any fever.  Denies any pain with eye motion.  She states that pain improves when she closes her eye.  She denies any loss of vision.  Denies any eyelid swelling.  She denies additional injury    ROS  Review of Systems   Eyes:  Positive for pain.   All other systems reviewed and are negative.        I have reviewed the following nursing documentation:  Allergies: No Known Allergies    Past medical history:  has a past medical history of Cancer (HCC), Clostridium difficile infection (04/02/2014), Endometrial cancer (HCC), blood clots (2010), Hyperlipidemia, Hypertension, and Migraines.    Past surgical history:  has a past surgical history that includes Hysterectomy; Tunneled venous port placement (Right, removal); Ovary removal (N/A, 2008); Colonoscopy; and Cholecystectomy, laparoscopic (N/A, 06/04/2021).    Home medications:   Prior to Admission medications    Medication Sig Start Date End Date Taking? Authorizing Provider   denosumab (PROLIA) 60 MG/ML SOSY SC injection Inject 1 mL into the skin once 7/14/21  Yes Nuha Willoughby MD   mirtazapine (REMERON) 7.5 MG tablet Take 1 tablet by mouth nightly 4/8/24  Yes Nuha Willoughby MD   ciprofloxacin (CILOXAN) 0.3 % ophthalmic solution Place 1 drop into the right eye every 2 hours for 17 days 7/15/24 8/1/24 Yes Aida Soriano MD   ketorolac (ACULAR) 0.5 % ophthalmic solution Place 1 drop into the right eye 4 times daily 7/15/24 9/3/24 Yes Aida Soriano MD

## 2024-07-15 NOTE — ED NOTES
Patient reports she got poked in the right eye with a drinking straw today.    [facility] Counseling: I discussed with the patient the risks of risankizumab-rzaa including but not limited to immunosuppression, and serious infections.  The patient understands that monitoring is required including a PPD at baseline and must alert us or the primary physician if symptoms of infection or other concerning signs are noted.

## 2024-08-31 ENCOUNTER — HOSPITAL ENCOUNTER (EMERGENCY)
Age: 74
Discharge: HOME | End: 2024-08-31
Attending: STUDENT IN AN ORGANIZED HEALTH CARE EDUCATION/TRAINING PROGRAM
Payer: MEDICARE

## 2024-08-31 VITALS
OXYGEN SATURATION: 98 % | BODY MASS INDEX: 22.36 KG/M2 | WEIGHT: 118.4 LBS | HEART RATE: 99 BPM | TEMPERATURE: 98.5 F | DIASTOLIC BLOOD PRESSURE: 84 MMHG | HEIGHT: 61 IN | SYSTOLIC BLOOD PRESSURE: 150 MMHG | RESPIRATION RATE: 16 BRPM

## 2024-08-31 DIAGNOSIS — N30.91 HEMORRHAGIC CYSTITIS: Primary | ICD-10-CM

## 2024-08-31 LAB
BACTERIA URNS QL MICRO: ABNORMAL /HPF
BILIRUB UR QL STRIP.AUTO: NEGATIVE
CLARITY UR: ABNORMAL
COLOR UR: YELLOW
EPI CELLS #/AREA URNS HPF: ABNORMAL /HPF (ref 0–5)
GLUCOSE UR STRIP.AUTO-MCNC: NEGATIVE MG/DL
HGB UR QL STRIP.AUTO: ABNORMAL
KETONES UR STRIP.AUTO-MCNC: NEGATIVE MG/DL
LEUKOCYTE ESTERASE UR QL STRIP.AUTO: ABNORMAL
NITRITE UR QL STRIP.AUTO: POSITIVE
PH UR STRIP.AUTO: 7.5 [PH] (ref 5–8)
PROT UR STRIP.AUTO-MCNC: 30 MG/DL
RBC #/AREA URNS HPF: >100 /HPF (ref 0–4)
SP GR UR STRIP.AUTO: 1.01 (ref 1–1.03)
UA COMPLETE W REFLEX CULTURE PNL UR: YES
UA DIPSTICK W REFLEX MICRO PNL UR: YES
URN SPEC COLLECT METH UR: ABNORMAL
UROBILINOGEN UR STRIP-ACNC: 0.2 E.U./DL
WBC #/AREA URNS HPF: ABNORMAL /HPF (ref 0–5)

## 2024-08-31 PROCEDURE — 6370000000 HC RX 637 (ALT 250 FOR IP): Performed by: STUDENT IN AN ORGANIZED HEALTH CARE EDUCATION/TRAINING PROGRAM

## 2024-08-31 PROCEDURE — 81001 URINALYSIS AUTO W/SCOPE: CPT

## 2024-08-31 PROCEDURE — 99283 EMERGENCY DEPT VISIT LOW MDM: CPT

## 2024-08-31 PROCEDURE — 87086 URINE CULTURE/COLONY COUNT: CPT

## 2024-08-31 RX ORDER — CEPHALEXIN 500 MG/1
500 CAPSULE ORAL ONCE
Status: COMPLETED | OUTPATIENT
Start: 2024-08-31 | End: 2024-08-31

## 2024-08-31 RX ORDER — PRIMIDONE 50 MG/1
50 TABLET ORAL 3 TIMES DAILY
COMMUNITY

## 2024-08-31 RX ORDER — GABAPENTIN 100 MG/1
100 CAPSULE ORAL 2 TIMES DAILY
COMMUNITY

## 2024-08-31 RX ORDER — CEPHALEXIN 500 MG/1
500 CAPSULE ORAL 2 TIMES DAILY
Qty: 14 CAPSULE | Refills: 0 | Status: SHIPPED | OUTPATIENT
Start: 2024-08-31 | End: 2024-09-07

## 2024-08-31 RX ADMIN — CEPHALEXIN 500 MG: 500 CAPSULE ORAL at 07:45

## 2024-08-31 ASSESSMENT — PAIN DESCRIPTION - DESCRIPTORS: DESCRIPTORS: BURNING

## 2024-08-31 ASSESSMENT — PAIN SCALES - GENERAL: PAINLEVEL_OUTOF10: 2

## 2024-08-31 ASSESSMENT — PAIN DESCRIPTION - LOCATION: LOCATION: PELVIS

## 2024-08-31 ASSESSMENT — PAIN - FUNCTIONAL ASSESSMENT: PAIN_FUNCTIONAL_ASSESSMENT: 0-10

## 2024-08-31 ASSESSMENT — PAIN DESCRIPTION - PAIN TYPE: TYPE: ACUTE PAIN

## 2024-08-31 NOTE — DISCHARGE INSTRUCTIONS
You were evaluated in the emergency department for urinary tract infection. Assessments and testing completed during your visit were reassuring and at this time there is no indication for further testing, treatment or admission to the hospital. Given this it is appropriate to discharge you from the emergency department. At the time of discharge we discussed the following:    Please take the antibiotics to completion and your condition should improve; you may continue AZO for bladder discomfort. Please follow up to primary doctor for further recommendations and return with any new or worsening symptoms.     Please note that sometimes it is difficult to diagnose a medical condition early in the disease process before the disease is fully manifest. Because of this, should you develop any new or worsening symptoms, you may return at any time to the emergency department for another evaluation. If available you are also recommended to review this visit with your primary care physician or other medical provider in the next 7 days. Thank you for allowing us to care for you today.

## 2024-08-31 NOTE — ED PROVIDER NOTES
[facility] EMERGENCY DEPARTMENT     EMERGENCY DEPARTMENT ENCOUNTER         Pt Name: Vivienne Colvin   MRN: [id number]   Birthdate 1950   Date of evaluation: 8/31/2024   Provider: Thiago Bhardwaj MD   PCP: Maral Castillo APRN - CNP   Note Started: 7:37 AM EDT 8/31/24       Chief Complaint     Dysuria      History of Present Illness     Vivienne Colvni is a 73 y.o. female who presents with 1 to 2 days of dysuria now developing hematuria.  Patient is fearful of a UTI.  She has had similar symptoms in the past with UTI though not in some time.  She has no history of complicated UTI.  She did take Azo for her bladder discomfort.  She has otherwise been in baseline health.  She has no fever or chills.  She is maintaining adequate oral intake of nutrition and hydration and otherwise with baseline GI/ function.  Fearful of UTI she presents for evaluation.      I have reviewed the nursing notes and agree unless otherwise noted.    Review of Systems     Positives and pertinent negatives as per HPI.    Past Medical, Surgical, Family, and Social History     She has a past medical history of Cancer (HCC), Clostridium difficile infection, Endometrial cancer (HCC), Hx of blood clots, Hyperlipidemia, Hypertension, and Migraines.  She has a past surgical history that includes Hysterectomy; Tunneled venous port placement (Right, removal); Ovary removal (N/A, 2008); Colonoscopy; and Cholecystectomy, laparoscopic (N/A, 06/04/2021).  Her family history includes Cancer in her mother; High Cholesterol in her father and mother; Hypertension in her mother.  She reports that she has never smoked. She has never used smokeless tobacco. She reports current alcohol use. She reports that she does not use drugs.    SCREENINGS:                                   [facility] Assessment  BP: (!) 150/84  Pulse: 99               Medications     Discharge Medication List as of 8/31/2024  7:43 AM        CONTINUE these medications which have NOT  worsen      DISCHARGE MEDICATIONS:  Discharge Medication List as of 8/31/2024  7:43 AM        START taking these medications    Details   cephALEXin (KEFLEX) 500 MG capsule Take 1 capsule by mouth 2 times daily for 7 days, Disp-14 capsule, R-0Normal             DISPOSITION Decision To Discharge 08/31/2024 07:22:46 AM  Condition at Disposition: Serafin Bhardwaj MD (electronically signed)  Attending Emergency Physician    Please note this documentation has been produced using speech recognition software and may contain errors related to that system including errors in grammar, punctuation, and spelling, as well as words and phrases that may be inappropriate.  Efforts were made to edit the dictations.         Thiago Bhardwaj MD  08/31/24 0757

## 2024-09-02 ENCOUNTER — HOSPITAL ENCOUNTER (EMERGENCY)
Age: 74
Discharge: HOME | End: 2024-09-02
Attending: EMERGENCY MEDICINE
Payer: MEDICARE

## 2024-09-02 ENCOUNTER — APPOINTMENT (OUTPATIENT)
Dept: CT IMAGING | Age: 74
End: 2024-09-02
Payer: MEDICARE

## 2024-09-02 VITALS
TEMPERATURE: 98.5 F | DIASTOLIC BLOOD PRESSURE: 86 MMHG | OXYGEN SATURATION: 98 % | WEIGHT: 114 LBS | HEART RATE: 74 BPM | RESPIRATION RATE: 18 BRPM | BODY MASS INDEX: 21.52 KG/M2 | HEIGHT: 61 IN | SYSTOLIC BLOOD PRESSURE: 171 MMHG

## 2024-09-02 DIAGNOSIS — M54.9 ACUTE BACK PAIN, UNSPECIFIED BACK LOCATION, UNSPECIFIED BACK PAIN LATERALITY: Primary | ICD-10-CM

## 2024-09-02 LAB
ALBUMIN SERPL-MCNC: 4.1 G/DL (ref 3.4–5)
ALBUMIN/GLOB SERPL: 1.6 {RATIO} (ref 1.1–2.2)
ALP SERPL-CCNC: 59 U/L (ref 40–129)
ALT SERPL-CCNC: 33 U/L (ref 10–40)
ANION GAP SERPL CALCULATED.3IONS-SCNC: 15 MMOL/L (ref 3–16)
AST SERPL-CCNC: 30 U/L (ref 15–37)
BACTERIA UR CULT: NORMAL
BASOPHILS # BLD: 0 K/UL (ref 0–0.2)
BASOPHILS NFR BLD: 0.4 %
BILIRUB SERPL-MCNC: <0.2 MG/DL (ref 0–1)
BILIRUB UR QL STRIP.AUTO: NEGATIVE
BUN SERPL-MCNC: 7 MG/DL (ref 7–20)
CALCIUM SERPL-MCNC: 9.3 MG/DL (ref 8.3–10.6)
CHLORIDE SERPL-SCNC: 92 MMOL/L (ref 99–110)
CLARITY UR: CLEAR
CO2 SERPL-SCNC: 27 MMOL/L (ref 21–32)
COLOR UR: YELLOW
CREAT SERPL-MCNC: 0.6 MG/DL (ref 0.6–1.2)
DEPRECATED RDW RBC AUTO: 13.9 % (ref 12.4–15.4)
EOSINOPHIL # BLD: 0 K/UL (ref 0–0.6)
EOSINOPHIL NFR BLD: 0.8 %
GFR SERPLBLD CREATININE-BSD FMLA CKD-EPI: >90 ML/MIN/{1.73_M2}
GLUCOSE SERPL-MCNC: 90 MG/DL (ref 70–99)
GLUCOSE UR STRIP.AUTO-MCNC: NEGATIVE MG/DL
HCT VFR BLD AUTO: 37.8 % (ref 36–48)
HGB BLD-MCNC: 12.8 G/DL (ref 12–16)
HGB UR QL STRIP.AUTO: NEGATIVE
KETONES UR STRIP.AUTO-MCNC: NEGATIVE MG/DL
LEUKOCYTE ESTERASE UR QL STRIP.AUTO: NEGATIVE
LYMPHOCYTES # BLD: 1 K/UL (ref 1–5.1)
LYMPHOCYTES NFR BLD: 21.6 %
MCH RBC QN AUTO: 31.1 PG (ref 26–34)
MCHC RBC AUTO-ENTMCNC: 33.8 G/DL (ref 31–36)
MCV RBC AUTO: 91.9 FL (ref 80–100)
MONOCYTES # BLD: 0.3 K/UL (ref 0–1.3)
MONOCYTES NFR BLD: 7.4 %
NEUTROPHILS # BLD: 3.3 K/UL (ref 1.7–7.7)
NEUTROPHILS NFR BLD: 69.8 %
NITRITE UR QL STRIP.AUTO: NEGATIVE
PH UR STRIP.AUTO: 7 [PH] (ref 5–8)
PLATELET # BLD AUTO: 182 K/UL (ref 135–450)
PMV BLD AUTO: 6.4 FL (ref 5–10.5)
POTASSIUM SERPL-SCNC: 4.2 MMOL/L (ref 3.5–5.1)
PROT SERPL-MCNC: 6.6 G/DL (ref 6.4–8.2)
PROT UR STRIP.AUTO-MCNC: NEGATIVE MG/DL
RBC # BLD AUTO: 4.12 M/UL (ref 4–5.2)
SODIUM SERPL-SCNC: 134 MMOL/L (ref 136–145)
SP GR UR STRIP.AUTO: 1.01 (ref 1–1.03)
UA COMPLETE W REFLEX CULTURE PNL UR: NORMAL
UA DIPSTICK W REFLEX MICRO PNL UR: NORMAL
URN SPEC COLLECT METH UR: NORMAL
UROBILINOGEN UR STRIP-ACNC: 0.2 E.U./DL
WBC # BLD AUTO: 4.7 K/UL (ref 4–11)

## 2024-09-02 PROCEDURE — 99284 EMERGENCY DEPT VISIT MOD MDM: CPT

## 2024-09-02 PROCEDURE — 6360000002 HC RX W HCPCS: Performed by: EMERGENCY MEDICINE

## 2024-09-02 PROCEDURE — 85025 COMPLETE CBC W/AUTO DIFF WBC: CPT

## 2024-09-02 PROCEDURE — 74176 CT ABD & PELVIS W/O CONTRAST: CPT

## 2024-09-02 PROCEDURE — 80053 COMPREHEN METABOLIC PANEL: CPT

## 2024-09-02 PROCEDURE — 96374 THER/PROPH/DIAG INJ IV PUSH: CPT

## 2024-09-02 PROCEDURE — 36415 COLL VENOUS BLD VENIPUNCTURE: CPT

## 2024-09-02 PROCEDURE — 81003 URINALYSIS AUTO W/O SCOPE: CPT

## 2024-09-02 RX ORDER — ONDANSETRON 2 MG/ML
4 INJECTION INTRAMUSCULAR; INTRAVENOUS ONCE
Status: COMPLETED | OUTPATIENT
Start: 2024-09-02 | End: 2024-09-02

## 2024-09-02 RX ADMIN — ONDANSETRON 4 MG: 2 INJECTION INTRAMUSCULAR; INTRAVENOUS at 11:05

## 2024-09-02 ASSESSMENT — PAIN DESCRIPTION - LOCATION
LOCATION: BACK;FLANK
LOCATION: BACK;FLANK

## 2024-09-02 ASSESSMENT — PAIN - FUNCTIONAL ASSESSMENT
PAIN_FUNCTIONAL_ASSESSMENT: ACTIVITIES ARE NOT PREVENTED
PAIN_FUNCTIONAL_ASSESSMENT: PREVENTS OR INTERFERES SOME ACTIVE ACTIVITIES AND ADLS
PAIN_FUNCTIONAL_ASSESSMENT: 0-10
PAIN_FUNCTIONAL_ASSESSMENT: 0-10

## 2024-09-02 ASSESSMENT — PAIN SCALES - GENERAL
PAINLEVEL_OUTOF10: 4
PAINLEVEL_OUTOF10: 3

## 2024-09-02 ASSESSMENT — PAIN DESCRIPTION - FREQUENCY
FREQUENCY: CONTINUOUS
FREQUENCY: CONTINUOUS

## 2024-09-02 ASSESSMENT — PAIN DESCRIPTION - ORIENTATION
ORIENTATION: RIGHT
ORIENTATION: RIGHT

## 2024-09-02 ASSESSMENT — PAIN DESCRIPTION - PAIN TYPE
TYPE: ACUTE PAIN
TYPE: ACUTE PAIN

## 2024-09-02 ASSESSMENT — PAIN DESCRIPTION - DESCRIPTORS
DESCRIPTORS: ACHING
DESCRIPTORS: DULL;ACHING

## 2024-09-02 ASSESSMENT — PAIN DESCRIPTION - ONSET
ONSET: ON-GOING
ONSET: ON-GOING

## 2024-09-02 NOTE — DISCHARGE INSTRUCTIONS
Rest.  Drink plenty of fluids.  Use Tylenol and ibuprofen for pain.      Follow up with your primary doctor for further evaluation.  Return to the ER with any worsening symptoms, or new concerns.

## 2024-09-02 NOTE — ED PROVIDER NOTES
that system including errors in grammar, punctuation, and spelling, as well as words and phrases that may be inappropriate. If there are any questions or concerns please feel free to contact the dictating provider for clarification.     Myles Vaughn MD   Acute Care [facility]       Myles Vaughn MD  09/02/24 4668

## (undated) DEVICE — PUMP SUC IRR TBNG L10FT W/ HNDPC ASSEMB STRYKEFLOW 2

## (undated) DEVICE — LAPAROSCOPIC CHOLANGIOGRAM CATHETER: Brand: AMERICAN CATHETER CORP

## (undated) DEVICE — [HIGH FLOW INSUFFLATOR,  DO NOT USE IF PACKAGE IS DAMAGED,  KEEP DRY,  KEEP AWAY FROM SUNLIGHT,  PROTECT FROM HEAT AND RADIOACTIVE SOURCES.]: Brand: PNEUMOSURE

## (undated) DEVICE — GLOVE,SURG,SENSICARE SLT,LF,PF,7: Brand: MEDLINE

## (undated) DEVICE — GAUZE,SPONGE,2"X2",8PLY,STERILE,LF,2'S: Brand: MEDLINE

## (undated) DEVICE — APPLIER CLP M L L11.4IN DIA10MM ENDOSCP ROT MULT FOR LIG

## (undated) DEVICE — TISSUE RETRIEVAL SYSTEM: Brand: INZII RETRIEVAL SYSTEM

## (undated) DEVICE — SOLUTION IV 1000ML LAC RINGERS PH 6.5 INJ USP VIAFLX PLAS

## (undated) DEVICE — TROCAR: Brand: KII FIOS FIRST ENTRY

## (undated) DEVICE — Device

## (undated) DEVICE — SUTURE VCRL + SZ 3-0 L18IN ABSRB UD SH 1/2 CIR TAPERCUT NDL VCP864D

## (undated) DEVICE — TROCAR: Brand: KII SLEEVE